# Patient Record
Sex: MALE | Race: ASIAN | URBAN - METROPOLITAN AREA
[De-identification: names, ages, dates, MRNs, and addresses within clinical notes are randomized per-mention and may not be internally consistent; named-entity substitution may affect disease eponyms.]

---

## 2018-03-06 ENCOUNTER — OFFICE VISIT (OUTPATIENT)
Dept: URGENT CARE | Facility: URGENT CARE | Age: 28
End: 2018-03-06
Payer: COMMERCIAL

## 2018-03-06 VITALS
WEIGHT: 200 LBS | HEART RATE: 96 BPM | DIASTOLIC BLOOD PRESSURE: 98 MMHG | TEMPERATURE: 98.2 F | RESPIRATION RATE: 12 BRPM | OXYGEN SATURATION: 98 % | SYSTOLIC BLOOD PRESSURE: 130 MMHG

## 2018-03-06 DIAGNOSIS — G43.809 OTHER MIGRAINE WITHOUT STATUS MIGRAINOSUS, NOT INTRACTABLE: ICD-10-CM

## 2018-03-06 DIAGNOSIS — R06.02 SOB (SHORTNESS OF BREATH): Primary | ICD-10-CM

## 2018-03-06 LAB
BASOPHILS # BLD AUTO: 0 10E9/L (ref 0–0.2)
BASOPHILS NFR BLD AUTO: 0.1 %
DIFFERENTIAL METHOD BLD: NORMAL
EOSINOPHIL # BLD AUTO: 0.1 10E9/L (ref 0–0.7)
EOSINOPHIL NFR BLD AUTO: 1.3 %
ERYTHROCYTE [DISTWIDTH] IN BLOOD BY AUTOMATED COUNT: 13.1 % (ref 10–15)
HCT VFR BLD AUTO: 47 % (ref 40–53)
HGB BLD-MCNC: 16 G/DL (ref 13.3–17.7)
LYMPHOCYTES # BLD AUTO: 3 10E9/L (ref 0.8–5.3)
LYMPHOCYTES NFR BLD AUTO: 42.3 %
MCH RBC QN AUTO: 28.3 PG (ref 26.5–33)
MCHC RBC AUTO-ENTMCNC: 34 G/DL (ref 31.5–36.5)
MCV RBC AUTO: 83 FL (ref 78–100)
MONOCYTES # BLD AUTO: 0.7 10E9/L (ref 0–1.3)
MONOCYTES NFR BLD AUTO: 9.4 %
NEUTROPHILS # BLD AUTO: 3.4 10E9/L (ref 1.6–8.3)
NEUTROPHILS NFR BLD AUTO: 46.9 %
PLATELET # BLD AUTO: 194 10E9/L (ref 150–450)
RBC # BLD AUTO: 5.66 10E12/L (ref 4.4–5.9)
WBC # BLD AUTO: 7.2 10E9/L (ref 4–11)

## 2018-03-06 PROCEDURE — 85025 COMPLETE CBC W/AUTO DIFF WBC: CPT | Performed by: FAMILY MEDICINE

## 2018-03-06 PROCEDURE — 99204 OFFICE O/P NEW MOD 45 MIN: CPT | Performed by: FAMILY MEDICINE

## 2018-03-06 PROCEDURE — 36415 COLL VENOUS BLD VENIPUNCTURE: CPT | Performed by: FAMILY MEDICINE

## 2018-03-06 PROCEDURE — 93000 ELECTROCARDIOGRAM COMPLETE: CPT | Performed by: FAMILY MEDICINE

## 2018-03-06 RX ORDER — PROMETHAZINE HYDROCHLORIDE 25 MG/1
25 TABLET ORAL EVERY 8 HOURS PRN
Qty: 10 TABLET | Refills: 0 | Status: SHIPPED | OUTPATIENT
Start: 2018-03-06

## 2018-03-06 RX ORDER — BIOTIN 10 MG
10000 TABLET ORAL DAILY
COMMUNITY

## 2018-03-06 NOTE — MR AVS SNAPSHOT
"              After Visit Summary   3/6/2018    Sana Lloyd    MRN: [id number]           Patient Information     Date Of Birth          1990        Visit Information        Provider Department      3/6/2018 7:45 PM Mirtha Michael MD [facility]        Today's Diagnoses     SOB (shortness of breath)    -  1    Other migraine without status migrainosus, not intractable           Follow-ups after your visit        Who to contact     If you have questions or need follow up information about today's clinic visit or your schedule please contact [facility] directly at [phone].  Normal or non-critical lab and imaging results will be communicated to you by MyChart, letter or phone within 4 business days after the clinic has received the results. If you do not hear from us within 7 days, please contact the clinic through [employer] or phone. If you have a critical or abnormal lab result, we will notify you by phone as soon as possible.  Submit refill requests through [employer] or call your pharmacy and they will forward the refill request to us. Please allow 3 business days for your refill to be completed.          Additional Information About Your Visit        MyChart Information     [employer] lets you send messages to your doctor, view your test results, renew your prescriptions, schedule appointments and more. To sign up, go to [email or web address] . Click on \"Log in\" on the left side of the screen, which will take you to the Welcome page. Then click on \"Sign up Now\" on the right side of the page.     You will be asked to enter the access code listed below, as well as some personal information. Please follow the directions to create your username and password.     Your access code is: [email or web address]  Expires: 2018  3:24 PM     Your access code will  in 90 days. If you need help or a new code, please call your [facility] clinic or [phone].        Care EveryWhere ID     " This is your Care EveryWhere ID. This could be used by other organizations to access your [place] medical records  [id number]        Your Vitals Were     Pulse Temperature Respirations Pulse Oximetry          96 98.2  F (36.8  C) (Tympanic) 12 98%         Blood Pressure from Last 3 Encounters:   03/06/18 (!) 130/98    Weight from Last 3 Encounters:   03/06/18 200 lb (90.7 kg)              We Performed the Following     CBC with platelets differential     EKG 12-lead complete w/read - Clinics          Today's Medication Changes          These changes are accurate as of 3/6/18 11:59 PM.  If you have any questions, ask your nurse or doctor.               Start taking these medicines.        Dose/Directions    promethazine 25 MG tablet   Commonly known as:  PHENERGAN   Used for:  Other migraine without status migrainosus, not intractable   Started by:  Mirtha Michael MD        Dose:  25 mg   Take 1 tablet (25 mg) by mouth every 8 hours as needed for nausea   Quantity:  10 tablet   Refills:  0            Where to get your medicines      Some of these will need a paper prescription and others can be bought over the counter.  Ask your nurse if you have questions.     Bring a paper prescription for each of these medications     promethazine 25 MG tablet                Primary Care Provider Fax #    Physician No Ref-Primary [phone]       No address on file        Equal Access to Services     [facility]: Emily paulao Sosherrieali, waaxda luqadaha, qaybta kaalmada adeegyada, timo harp. So [facility] [phone].    ATENCIÓN: Si habla español, tiene a palumbo disposición servicios gratuitos de asistencia lingüística. Llame al [phone].    We comply with applicable federal civil rights laws and [place] laws. We do not discriminate on the basis of race, color, national origin, age, disability, sex, sexual orientation, or gender identity.            Thank you!     Thank you for  [facility]  for your care. Our goal is always to provide you with excellent care. Hearing back from our patients is one way we can continue to improve our services. Please take a few minutes to complete the written survey that you may receive in the mail after your visit with us. Thank you!             Your Updated Medication List - Protect others around you: Learn how to safely use, store and throw away your medicines at www.disposemymeds.org.          This list is accurate as of 3/6/18 11:59 PM.  Always use your most recent med list.                   Brand Name Dispense Instructions for use Diagnosis    acetaminophen-caffeine 500-65 MG Tabs    EXCEDRIN TENSION HEADACHE     Take 2 tablets by mouth every 6 hours as needed for mild pain        APPLE CIDER VINEGAR PO           Biotin 10 MG Tabs tablet      Take 10,000 mcg by mouth daily        promethazine 25 MG tablet    PHENERGAN    10 tablet    Take 1 tablet (25 mg) by mouth every 8 hours as needed for nausea    Other migraine without status migrainosus, not intractable

## 2018-03-07 NOTE — PROGRESS NOTES
SUBJECTIVE:   Sana Lloyd is a 27 year old male presenting with a chief complaint of   Chief Complaint   Patient presents with     Urgent Care     Shortness of breath and dizziness since 4pm today. Migraines off and on since last thursday.       He is a new patient of [place].    Pt presents with shortness of breath and dizziness since 4 pm today.  He describes the dizziness as a sensation of being light-headed.  He denies vertigo.  Has a history of migraines, and has had a headache intermittently since last Thursday.  Headache is mostly right-sided and seems to start from his neck and move over the top of his head.  He has been managing this with Excedrin, which provides some, but not complete, relief of symptoms.  He has been feeling a little more tired.      No prior pulmonary problems, including asthma.    Review of Systems   Constitutional: Positive for fatigue. Negative for fever.   HENT: Negative for congestion, sore throat and trouble swallowing.    Eyes: Negative for visual disturbance.   Respiratory: Negative for cough and wheezing.    Cardiovascular: Negative for chest pain and palpitations.   Gastrointestinal: Negative for diarrhea and vomiting.   Musculoskeletal: Negative for gait problem.   Skin: Negative for rash.   Neurological: Positive for light-headedness. Negative for speech difficulty, weakness and numbness.   Psychiatric/Behavioral: Negative for confusion.       No past medical history on file.--migraines  No family history on file.   No family history of lung issues.  Sister had anemia, he thinks.    Current Outpatient Prescriptions   Medication Sig Dispense Refill     promethazine (PHENERGAN) 25 MG tablet Take 1 tablet (25 mg) by mouth every 8 hours as needed for nausea 10 tablet 0     acetaminophen-caffeine (EXCEDRIN TENSION HEADACHE) 500-65 MG TABS Take 2 tablets by mouth every 6 hours as needed for mild pain       Biotin 10 MG TABS tablet Take 10,000 mcg by mouth daily       [facility]ER  [facility]         Social History   Substance Use Topics     Smoking status: Current Every Day Smoker     Smokeless tobacco: Never Used     Alcohol use Not on file       OBJECTIVE  BP (!) 130/98 (BP Location: Right arm, Patient Position: Sitting, Cuff Size: Adult Regular)  Pulse 96  Temp 98.2  F (36.8  C) (Tympanic)  Resp 12  Wt 200 lb (90.7 kg)  SpO2 98%    Physical Exam   Constitutional: He is oriented to person, place, and time. He appears well-developed and well-nourished. No distress.   HENT:   Head: Normocephalic and atraumatic.   Right Ear: Tympanic membrane, external ear and ear canal normal.   Left Ear: Tympanic membrane, external ear and ear canal normal.   Mouth/Throat: Oropharynx is clear and moist. No oropharyngeal exudate.   Eyes: Conjunctivae are normal. Pupils are equal, round, and reactive to light. Right eye exhibits no discharge. Left eye exhibits no discharge. No scleral icterus.   Neck: Normal range of motion. Neck supple. No tracheal deviation present. No thyromegaly present.   Cardiovascular: Regular rhythm and normal heart sounds.  Exam reveals no friction rub.    No murmur heard.  Very slightly tachycardic   Pulmonary/Chest: Effort normal and breath sounds normal. No stridor. No respiratory distress.   Abdominal: Soft. Bowel sounds are normal.   Musculoskeletal: He exhibits no edema or deformity.   Lymphadenopathy:     He has no cervical adenopathy.   Neurological: He is alert and oriented to person, place, and time. No cranial nerve deficit. He exhibits normal muscle tone. Coordination normal.   Skin: Skin is warm and dry. No rash noted. He is not diaphoretic.   Psychiatric: He has a normal mood and affect. His behavior is normal.       Labs:  Results for orders placed or performed in visit on 03/06/18   CBC with platelets differential   Result Value Ref Range    WBC 7.2 4.0 - 11.0 10e9/L    RBC Count 5.66 4.4 - 5.9 10e12/L    Hemoglobin 16.0 13.3 - 17.7 g/dL    Hematocrit 47.0 40.0 -  53.0 %    MCV 83 78 - 100 fl    MCH 28.3 26.5 - 33.0 pg    MCHC 34.0 31.5 - 36.5 g/dL    RDW 13.1 10.0 - 15.0 %    Platelet Count 194 150 - 450 10e9/L    Diff Method Automated Method     % Neutrophils 46.9 %    % Lymphocytes 42.3 %    % Monocytes 9.4 %    % Eosinophils 1.3 %    % Basophils 0.1 %    Absolute Neutrophil 3.4 1.6 - 8.3 10e9/L    Absolute Lymphocytes 3.0 0.8 - 5.3 10e9/L    Absolute Monocytes 0.7 0.0 - 1.3 10e9/L    Absolute Eosinophils 0.1 0.0 - 0.7 10e9/L    Absolute Basophils 0.0 0.0 - 0.2 10e9/L     EKG shows sinus tachycardia with a rate of 108 bpm.  There are no ST/T changes to suggest ischemia or strain.  There are no ectopic beats.  Normal axis.      ASSESSMENT:      ICD-10-CM    1. SOB (shortness of breath) R06.02 EKG 12-lead complete w/read - Clinics     CBC with platelets differential   2. Other migraine without status migrainosus, not intractable G43.809 promethazine (PHENERGAN) 25 MG tablet        Medical Decision Making:  Shortness of breath does not seem to stem from an arrhythmia (reassuring EKG), anemia (normal Hgb).  PE very unlikely given stable vitals, including oxygen sat of 98%.  No evidence of pneumonia on exam.  I don't see a clear etiology for this shortness of breath at this time, but I also don't see anything to suggest that there is a major issue underlying it.    Migraines seem to respond fairly well to OTC analgesic, but given clustering of headaches in recent days, I think it's reasonable to add Phenergan to see if this will help to break the migraine cycle. May benefit from follow up with neurologist if headache frequency stays high.    PLAN:  Phenergan 25 mg PO up to three times a day as needed for nausea/headache.  Reassured pt regarding normal EKG and bloodwork.  Discussed signs/symptoms to watch for and what should prompt follow up in clinic vs ER.  Encouraged pt to establish with a primary care provider.

## 2018-03-07 NOTE — NURSING NOTE
Chief Complaint   Patient presents with     Urgent Care     Shortness of breath and dizziness since 4pm today. Migraines off and on since last thursday.       Initial BP (!) 130/98 (BP Location: Right arm, Patient Position: Sitting, Cuff Size: Adult Regular)  Pulse 96  Temp 98.2  F (36.8  C) (Tympanic)  Resp 12  Wt 200 lb (90.7 kg)  SpO2 98% There is no height or weight on file to calculate BMI.  Medication Reconciliation: complete   Jagruti Wong CMA (AAMA)

## 2018-03-13 ASSESSMENT — ENCOUNTER SYMPTOMS
VOMITING: 0
SORE THROAT: 0
WHEEZING: 0
FATIGUE: 1
CONFUSION: 0
DIARRHEA: 0
TROUBLE SWALLOWING: 0
WEAKNESS: 0
NUMBNESS: 0
FEVER: 0
PALPITATIONS: 0
LIGHT-HEADEDNESS: 1
SPEECH DIFFICULTY: 0
COUGH: 0

## 2018-04-05 ENCOUNTER — OFFICE VISIT (OUTPATIENT)
Dept: FAMILY MEDICINE | Facility: CLINIC | Age: 28
End: 2018-04-05
Payer: COMMERCIAL

## 2018-04-05 ENCOUNTER — RADIANT APPOINTMENT (OUTPATIENT)
Dept: GENERAL RADIOLOGY | Facility: CLINIC | Age: 28
End: 2018-04-05
Attending: FAMILY MEDICINE
Payer: COMMERCIAL

## 2018-04-05 ENCOUNTER — NURSE TRIAGE (OUTPATIENT)
Dept: NURSING | Facility: CLINIC | Age: 28
End: 2018-04-05

## 2018-04-05 VITALS
OXYGEN SATURATION: 97 % | TEMPERATURE: 98.1 F | HEART RATE: 95 BPM | DIASTOLIC BLOOD PRESSURE: 97 MMHG | HEIGHT: 71 IN | BODY MASS INDEX: 28.06 KG/M2 | SYSTOLIC BLOOD PRESSURE: 126 MMHG | WEIGHT: 200.4 LBS

## 2018-04-05 DIAGNOSIS — S59.902A ELBOW INJURY, LEFT, INITIAL ENCOUNTER: ICD-10-CM

## 2018-04-05 DIAGNOSIS — S59.902A ELBOW INJURY, LEFT, INITIAL ENCOUNTER: Primary | ICD-10-CM

## 2018-04-05 DIAGNOSIS — S52.125A CLOSED NONDISPLACED FRACTURE OF HEAD OF LEFT RADIUS, INITIAL ENCOUNTER: ICD-10-CM

## 2018-04-05 PROCEDURE — 99213 OFFICE O/P EST LOW 20 MIN: CPT | Performed by: FAMILY MEDICINE

## 2018-04-05 PROCEDURE — 73080 X-RAY EXAM OF ELBOW: CPT | Mod: LT

## 2018-04-05 NOTE — MR AVS SNAPSHOT
"              After Visit Summary   2018    Sana Lloyd    MRN: [id number]           Patient Information     Date Of Birth          1990        Visit Information        Provider Department      2018 3:40 PM Sergio Vargas MD [facility]        Today's Diagnoses     Elbow injury, left, initial encounter    -  1      Care Instructions    acetomenophen 1000mg up to 3 times daily    ibuprofen 800mg up to 3 times daily          Follow-ups after your visit        Who to contact     If you have questions or need follow up information about today's clinic visit or your schedule please contact [facility] directly at [phone].  Normal or non-critical lab and imaging results will be communicated to you by MyChart, letter or phone within 4 business days after the clinic has received the results. If you do not hear from us within 7 days, please contact the clinic through [employer] or phone. If you have a critical or abnormal lab result, we will notify you by phone as soon as possible.  Submit refill requests through [employer] or call your pharmacy and they will forward the refill request to us. Please allow 3 business days for your refill to be completed.          Additional Information About Your Visit        MyChart Information     [employer] lets you send messages to your doctor, view your test results, renew your prescriptions, schedule appointments and more. To sign up, go to [email or web address] . Click on \"Log in\" on the left side of the screen, which will take you to the Welcome page. Then click on \"Sign up Now\" on the right side of the page.     You will be asked to enter the access code listed below, as well as some personal information. Please follow the directions to create your username and password.     Your access code is: [email or web address]  Expires: 2018  3:24 PM     Your access code will  in 90 days. If you need help or a new code, please call your " "[facility] or [phone].        Care EveryWhere ID     This is your Care EveryWhere ID. This could be used by other organizations to access your [place] medical records  [id number]        Your Vitals Were     Pulse Temperature Height Pulse Oximetry BMI (Body Mass Index)       95 98.1  F (36.7  C) (Oral) 5' 11.25\" (1.81 m) 97% 27.75 kg/m2        Blood Pressure from Last 3 Encounters:   04/05/18 (!) 126/97   03/06/18 (!) 130/98    Weight from Last 3 Encounters:   04/05/18 200 lb 6.4 oz (90.9 kg)   03/06/18 200 lb (90.7 kg)              Today, you had the following     No orders found for display       Primary Care Provider Fax #    Physician No Ref-Primary [phone]       No address on file        Equal Access to Services     [facility]: Hadsharad Benedict, kole galeano, darcy kaalmanidia griffin, timo castañeda . So [facility] [phone].    ATENCIÓN: Si habla español, tiene a palumbo disposición servicios gratuitos de asistencia lingüística. Llame al [phone].    We comply with applicable federal civil rights laws and [place] laws. We do not discriminate on the basis of race, color, national origin, age, disability, sex, sexual orientation, or gender identity.            Thank you!     Thank you for choosing [facility]  for your care. Our goal is always to provide you with excellent care. Hearing back from our patients is one way we can continue to improve our services. Please take a few minutes to complete the written survey that you may receive in the mail after your visit with us. Thank you!             Your Updated Medication List - Protect others around you: Learn how to safely use, store and throw away your medicines at www.disposemymeds.org.          This list is accurate as of 4/5/18  4:37 PM.  Always use your most recent med list.                   Brand Name Dispense Instructions for use Diagnosis    acetaminophen-caffeine 500-65 MG " Tabs    EXCEDRIN TENSION HEADACHE     Take 2 tablets by mouth every 6 hours as needed for mild pain        APPLE CIDER VINEGAR PO           Biotin 10 MG Tabs tablet      Take 10,000 mcg by mouth daily        promethazine 25 MG tablet    PHENERGAN    10 tablet    Take 1 tablet (25 mg) by mouth every 8 hours as needed for nausea    Other migraine without status migrainosus, not intractable

## 2018-04-05 NOTE — PROGRESS NOTES
"  SUBJECTIVE:                                                    Sana Lloyd is a 27 year old male who presents to clinic today for the following health issues:      Slipped and fell on left elbow occurred yesterday. Hurt to shara and when sleeping.     No numbness/tingling     Using ice and ibuprofen.     OBJECTIVE: BP (!) 126/97  Pulse 95  Temp 98.1  F (36.7  C) (Oral)  Ht 5' 11.25\" (1.81 m)  Wt 200 lb 6.4 oz (90.9 kg)  SpO2 97%  BMI 27.75 kg/m2 no apparent distress   Elbow exam: reduced range of motion of flexion and extension and rotation. Effusion present. Tender over radial head and lateral elbow.   Cms intact.       Xray with nondisplaced radial head fracture affecting approx 20% of the joint surface.       ICD-10-CM    1. Elbow injury, left, initial encounter S59.902A order for DME     ORTHO  REFERRAL     CANCELED: XR Elbow Left 2 Views   2. Closed nondisplaced fracture of head of left radius, initial encounter S52.125A ORTHO  REFERRAL    sling and PRICE therapy. follow up with sports med. Use of OTC  meds. Discussed.  "

## 2018-04-05 NOTE — TELEPHONE ENCOUNTER
Reason for Disposition    Can't bend injured elbow at all    Additional Information    Negative: Serious injury with multiple fractures    Negative: [1] Major bleeding (e.g., actively dripping or spurting) AND [2] can't be stopped    Negative: Bullet wound, stabbed by knife, or other serious penetrating wound    Negative: Sounds like a life-threatening emergency to the triager    Negative: Wound looks infected    Negative: Elbow pain from overuse (work, exercise, gardening) OR from self-induced lifting injury    Negative: Elbow pain not from an injury    Negative: Looks like a broken bone or dislocated joint (e.g., crooked or deformed)    Negative: Skin is split open or gaping  (or length > 1/2 inch or 12 mm)    Negative: [1] Bleeding AND [2] won't stop after 10 minutes of direct pressure (using correct technique)    Negative: [1] Dirt in the wound AND [2] not removed with 15 minutes of scrubbing    Protocols used: ELBOW INJURY-ADULT-    He said his insurance won't cover an ER visit. He was going to go to urgent care but wants to try for a clinic visit first. I connected with scheduling for an appointment.  Andra Richmond RN-[facility]

## 2018-04-10 ENCOUNTER — OFFICE VISIT (OUTPATIENT)
Dept: ORTHOPEDICS | Facility: CLINIC | Age: 28
End: 2018-04-10

## 2018-04-10 ENCOUNTER — RADIANT APPOINTMENT (OUTPATIENT)
Dept: GENERAL RADIOLOGY | Facility: CLINIC | Age: 28
End: 2018-04-10

## 2018-04-10 VITALS — WEIGHT: 200 LBS | RESPIRATION RATE: 16 BRPM | BODY MASS INDEX: 28 KG/M2 | HEIGHT: 71 IN

## 2018-04-10 DIAGNOSIS — S52.125A CLOSED NONDISPLACED FRACTURE OF HEAD OF LEFT RADIUS: ICD-10-CM

## 2018-04-10 DIAGNOSIS — S52.125A CLOSED NONDISPLACED FRACTURE OF HEAD OF LEFT RADIUS, INITIAL ENCOUNTER: Primary | ICD-10-CM

## 2018-04-10 DIAGNOSIS — S52.125A CLOSED NONDISPLACED FRACTURE OF HEAD OF LEFT RADIUS: Primary | ICD-10-CM

## 2018-04-10 NOTE — PROGRESS NOTES
"Sports Medicine Clinic Visit    PCP: No Ref-Primary, Physician    Sana Lloyd is a 27 year old male who is seen  in consultation at the request of  presenting with left radial head fracture.    Injury: He fell on ice and landed on left elbow.     Location of Pain: left elbow  Duration of Pain: 6 day(s)  Rating of Pain: 4/10  Pain is better with: Ice  Pain is worse with: Elbow extension, sleeping, elbow flexion for long periods of time  Additional Features: He is right handed, Works for a non-profit   Treatment so far consists of: Ice, sling, ace wrapping  Prior History of related problems: None    Resp 16  Ht 5' 11.25\" (1.81 m)  Wt 200 lb (90.7 kg)  BMI 27.7 kg/m2         PMH:  No past medical history on file.    Active problem list:  There is no problem list on file for this patient.      FH:  No family history on file.    SH:  Social History     Social History     Marital status: Single     Spouse name: N/A     Number of children: N/A     Years of education: N/A     Occupational History     Not on file.     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 Right handed.    MEDS:  See EMR, reviewed  ALL:  See EMR, reviewed    REVIEW OF SYSTEMS:  CONSTITUTIONAL:NEGATIVE for fever, chills, change in weight  INTEGUMENTARY/SKIN: NEGATIVE for worrisome rashes, moles or lesions  EYES: NEGATIVE for vision changes or irritation  ENT/MOUTH: NEGATIVE for ear, mouth and throat problems  RESP:NEGATIVE for significant cough or SOB  BREAST: NEGATIVE for masses, tenderness or discharge  CV: NEGATIVE for chest pain, palpitations or peripheral edema  GI: NEGATIVE for nausea, abdominal pain, heartburn, or change in bowel habits  :NEGATIVE for frequency, dysuria, or " hematuria  :NEGATIVE for frequency, dysuria, or hematuria  NEURO: NEGATIVE for weakness, dizziness or paresthesias  ENDOCRINE: NEGATIVE for temperature intolerance, skin/hair changes  HEME/ALLERGY/IMMUNE: NEGATIVE for bleeding problems  PSYCHIATRIC: NEGATIVE for changes in mood or affect    OBJECTIVE:  He denies shoulder or wrist discomfort.  He is nontender at the scaphoid, the lunate, the distal radius or distal ulna on the left.  His grasp strength is full.  He has some residual swelling of the left elbow.  He can achieve nearly full passive extension and flexion allowed to about 90 degrees.  He is nontender over the lateral epicondyle, medial epicondyle, ulnar groove and mildly tender over the radial head.  There is no tenderness at the shoulder, at the AC joint, anterior cuff or biceps tendon . No tenderness at the proximal humerus.  His internal and external rotation strength against resistance is intact without pain.      IMAGING:  A repeat set of x-rays compared to 6 days ago show a nondisplaced proximal radial head fracture with no signs of step-off.      ASSESSMENT:  Nondisplaced radial head fracture.      PLAN:  He has a sling for comfort.  He can remove it for gentle range of motion in a forward plane.  He will avoid excessive pronation and supination over the next 3 weeks.  He will avoid pushing and weightbearing with the extremity for the next 4 weeks.  He will follow up in 2-3 weeks for a repeat x-ray and clinical exam.  He declined the need for pain medicines.

## 2018-04-10 NOTE — Clinical Note
Thank you for allowing me to see your patient in Sports Medicine Clinic.  Please see the attached copy of our visit.  Sincerely,  Agustin More MD

## 2018-04-10 NOTE — LETTER
"  4/10/2018      RE: Sana Lloyd  [address]    [place] [address]       Sports Medicine Clinic Visit    PCP: No Ref-Primary, Physician    Sana Lloyd is a 27 year old male who is seen  in consultation at the request of  presenting with left radial head fracture.    Injury: He fell on ice and landed on left elbow.     Location of Pain: left elbow  Duration of Pain: 6 day(s)  Rating of Pain: 4/10  Pain is better with: Ice  Pain is worse with: Elbow extension, sleeping, elbow flexion for long periods of time  Additional Features: He is right handed, Works for a non-profit   Treatment so far consists of: Ice, sling, ace wrapping  Prior History of related problems: None    Resp 16  Ht 5' 11.25\" (1.81 m)  Wt 200 lb (90.7 kg)  BMI 27.7 kg/m2         PMH:  No past medical history on file.    Active problem list:  There is no problem list on file for this patient.      FH:  No family history on file.    SH:  Social History     Social History     Marital status: Single     Spouse name: N/A     Number of children: N/A     Years of education: N/A     Occupational History     Not on file.     Social History Main Topics     Smoking status: Current Every Day Smoker     Smokeless tobacco: Never Used     Alcohol use Yes     Drug use: No     Sexual activity: Yes     Partners: Male     Other Topics Concern     Not on file     Social History Narrative   works for a [employer], desk work, typing. Right handed.    MEDS:  See EMR, reviewed  ALL:  See EMR, reviewed    REVIEW OF SYSTEMS:  CONSTITUTIONAL:NEGATIVE for fever, chills, change in weight  INTEGUMENTARY/SKIN: NEGATIVE for worrisome rashes, moles or lesions  EYES: NEGATIVE for vision changes or irritation  ENT/MOUTH: NEGATIVE for ear, mouth and throat problems  RESP:NEGATIVE for significant cough or SOB  BREAST: NEGATIVE for masses, tenderness or discharge  CV: NEGATIVE for chest pain, palpitations or peripheral edema  GI: NEGATIVE for nausea, abdominal pain, " heartburn, or change in bowel habits  :NEGATIVE for frequency, dysuria, or hematuria  :NEGATIVE for frequency, dysuria, or hematuria  NEURO: NEGATIVE for weakness, dizziness or paresthesias  ENDOCRINE: NEGATIVE for temperature intolerance, skin/hair changes  HEME/ALLERGY/IMMUNE: NEGATIVE for bleeding problems  PSYCHIATRIC: NEGATIVE for changes in mood or affect    OBJECTIVE:  He denies shoulder or wrist discomfort.  He is nontender at the scaphoid, the lunate, the distal radius or distal ulna on the left.  His grasp strength is full.  He has some residual swelling of the left elbow.  He can achieve nearly full passive extension and flexion allowed to about 90 degrees.  He is nontender over the lateral epicondyle, medial epicondyle, ulnar groove and mildly tender over the radial head.  There is no tenderness at the shoulder, at the AC joint, anterior cuff or biceps tendon . No tenderness at the proximal humerus.  His internal and external rotation strength against resistance is intact without pain.      IMAGING:  A repeat set of x-rays compared to 6 days ago show a nondisplaced proximal radial head fracture with no signs of step-off.      ASSESSMENT:  Nondisplaced radial head fracture.      PLAN:  He has a sling for comfort.  He can remove it for gentle range of motion in a forward plane.  He will avoid excessive pronation and supination over the next 3 weeks.  He will avoid pushing and weightbearing with the extremity for the next 4 weeks.  He will follow up in 2-3 weeks for a repeat x-ray and clinical exam.  He declined the need for pain medicines.       Agustin More MD

## 2018-04-10 NOTE — MR AVS SNAPSHOT
After Visit Summary   4/10/2018    Sana Lloyd    MRN: [id number]           Patient Information     Date Of Birth          1990        Visit Information        Provider Department      4/10/2018 12:15 PM Agustin More MD [facility] Medicine        Today's Diagnoses     Closed nondisplaced fracture of head of left radius, initial encounter    -  1       Follow-ups after your visit        Your next 10 appointments already scheduled     2018 12:00 PM CDT   (Arrive by 11:45 AM)   Return Visit with Agustin More MD   [facility] ([facility])    [address] [place] [id number] [phone]              Who to contact     Please call your clinic at [phone] to:    Ask questions about your health    Make or cancel appointments    Discuss your medicines    Learn about your test results    Speak to your doctor            Additional Information About Your Visit        MyChart Information     [employer] is an electronic gateway that provides easy, online access to your medical records. With [employer], you can request a clinic appointment, read your test results, renew a prescription or communicate with your care team.     To sign up for [employer] visit the website at [email or web address]   You will be asked to enter the access code listed below, as well as some personal information. Please follow the directions to create your username and password.     Your access code is: [email or web address]  Expires: 2018  3:24 PM     Your access code will  in 90 days. If you need help or a new code, please contact your [facility] or call [phone] for assistance.        Care EveryWhere ID     This is your Care EveryWhere ID. This could be used by other organizations to access your [place] medical records  [id number]        Your Vitals Were     Respirations Height BMI (Body Mass Index)  "            16 5' 11.25\" (1.81 m) 27.7 kg/m2          Blood Pressure from Last 3 Encounters:   04/05/18 (!) 126/97   03/06/18 (!) 130/98    Weight from Last 3 Encounters:   04/10/18 200 lb (90.7 kg)   04/05/18 200 lb 6.4 oz (90.9 kg)   03/06/18 200 lb (90.7 kg)              Today, you had the following     No orders found for display       Primary Care Provider Fax #    Physician No Ref-Primary [phone]       No address on file        Equal Access to Services     [facility]: Hadii kiana winter Sodelon, waaxda luqadaha, qaybprasanth kaalmanidia griffin, timo castañeda . So [facility] [phone].    ATENCIÓN: Si habla español, tiene a palumbo disposición servicios gratuitos de asistencia lingüística. Llame al [phone].    We comply with applicable federal civil rights laws and [place] laws. We do not discriminate on the basis of race, color, national origin, age, disability, sex, sexual orientation, or gender identity.            Thank you!     Thank you for choosing [facility]  for your care. Our goal is always to provide you with excellent care. Hearing back from our patients is one way we can continue to improve our services. Please take a few minutes to complete the written survey that you may receive in the mail after your visit with us. Thank you!             Your Updated Medication List - Protect others around you: Learn how to safely use, store and throw away your medicines at www.disposemymeds.org.          This list is accurate as of 4/10/18 12:34 PM.  Always use your most recent med list.                   Brand Name Dispense Instructions for use Diagnosis    acetaminophen-caffeine 500-65 MG Tabs    EXCEDRIN TENSION HEADACHE     Take 2 tablets by mouth every 6 hours as needed for mild pain        APPLE CIDER VINEGAR PO           Biotin 10 MG Tabs tablet      Take 10,000 mcg by mouth daily        IBUPROFEN PO           order for DME     1 Device    Equipment being " ordered: sling    Elbow injury, left, initial encounter       promethazine 25 MG tablet    PHENERGAN    10 tablet    Take 1 tablet (25 mg) by mouth every 8 hours as needed for nausea    Other migraine without status migrainosus, not intractable

## 2018-04-23 DIAGNOSIS — S52.125A CLOSED NONDISPLACED FRACTURE OF HEAD OF LEFT RADIUS: Primary | ICD-10-CM

## 2018-04-24 ENCOUNTER — OFFICE VISIT (OUTPATIENT)
Dept: ORTHOPEDICS | Facility: CLINIC | Age: 28
End: 2018-04-24

## 2018-04-24 ENCOUNTER — RADIANT APPOINTMENT (OUTPATIENT)
Dept: GENERAL RADIOLOGY | Facility: CLINIC | Age: 28
End: 2018-04-24

## 2018-04-24 VITALS — WEIGHT: 200 LBS | HEIGHT: 71 IN | BODY MASS INDEX: 28 KG/M2 | RESPIRATION RATE: 16 BRPM

## 2018-04-24 DIAGNOSIS — S52.125D CLOSED NONDISPLACED FRACTURE OF HEAD OF LEFT RADIUS WITH ROUTINE HEALING, SUBSEQUENT ENCOUNTER: Primary | ICD-10-CM

## 2018-04-24 DIAGNOSIS — S52.125A CLOSED NONDISPLACED FRACTURE OF HEAD OF LEFT RADIUS: ICD-10-CM

## 2018-04-24 NOTE — PROGRESS NOTES
April 24, 2018: Snaa Lloyd is a 27 year old male who is seen in f/u up for closed nondisplaced fracture of head of left radius.  Notes improvement with elbow flexion, still is uncomfortable with pronation.      PMH:  No past medical history on file.    Active problem list:  There is no problem list on file for this patient.      FH:  No family history on file.    SH:  Social History     Social History     Marital status: Single     Spouse name: N/A     Number of children: N/A     Years of education: N/A     Occupational History     Not on file.     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 Right handed.  From [facility], [place]. Mother medic [employer].    MEDS:  See EMR, reviewed  ALL:  See EMR, reviewed    REVIEW OF SYSTEMS:  CONSTITUTIONAL:NEGATIVE for fever, chills, change in weight  INTEGUMENTARY/SKIN: NEGATIVE for worrisome rashes, moles or lesions  EYES: NEGATIVE for vision changes or irritation  ENT/MOUTH: NEGATIVE for ear, mouth and throat problems  RESP:NEGATIVE for significant cough or SOB  BREAST: NEGATIVE for masses, tenderness or discharge  CV: NEGATIVE for chest pain, palpitations or peripheral edema  GI: NEGATIVE for nausea, abdominal pain, heartburn, or change in bowel habits  :NEGATIVE for frequency, dysuria, or hematuria  :NEGATIVE for frequency, dysuria, or hematuria  NEURO: NEGATIVE for weakness, dizziness or paresthesias  ENDOCRINE: NEGATIVE for temperature intolerance, skin/hair change  HEME/ALLERGY/IMMUNE: NEGATIVE for bleeding problems  PSYCHIATRIC: NEGATIVE for changes in mood or affect              OBJECTIVE:  He has nearly full extension compared to the non-affected elbow, missing only a few degrees of recurvatum.   Flexion is symmetrical to the 2 elbows.  He can pronate and supinate fully without any significant discomfort.  Overlying skin is intact.  There is a very mild amount of residual swelling.  Strength is intact in the hand.  Sensation is normal.  Distal pulses normal.  Appropriate in conversation and affect.      A repeat x-ray shows new bone formation, healing and some bony resorption consistent with ongoing healing involving the closed, nondisplaced head of the left radius.      ASSESSMENT:  Left radial head fracture, improved.      PLAN:  He will continue to limit himself from forceful weightbearing and forefoot pronation or supination over the next 3 weeks, but he can continue with his otherwise gentle range of motion exercises as outlined.  After 3-4 weeks if he feels that his range of motion and his symptoms have improved, he can start to be more active with his elbow.  If he is having trouble progressing with this, he will return for reevaluation.

## 2018-04-24 NOTE — LETTER
Date:April 27, 2018      Patient was self referred, no letter generated. Do not send.        [facility] Health Information

## 2018-04-24 NOTE — MR AVS SNAPSHOT
"              After Visit Summary   2018    Sana Lloyd    MRN: [id number]           Patient Information     Date Of Birth          1990        Visit Information        Provider Department      2018 12:00 PM Agustin More MD [facility] Sports Medicine        Today's Diagnoses     Closed nondisplaced fracture of head of left radius with routine healing, subsequent encounter    -  1       Follow-ups after your visit        Who to contact     Please call your clinic at [phone] to:    Ask questions about your health    Make or cancel appointments    Discuss your medicines    Learn about your test results    Speak to your doctor            Additional Information About Your Visit        MyChart Information     [employer] is an electronic gateway that provides easy, online access to your medical records. With [employer], you can request a clinic appointment, read your test results, renew a prescription or communicate with your care team.     To sign up for [employer] visit the website at [email or web address]   You will be asked to enter the access code listed below, as well as some personal information. Please follow the directions to create your username and password.     Your access code is: [email or web address]  Expires: 2018  3:24 PM     Your access code will  in 90 days. If you need help or a new code, please contact your [facility] or call [phone] for assistance.        Care EveryWhere ID     This is your Care EveryWhere ID. This could be used by other organizations to access your [place] medical records  [id number]        Your Vitals Were     Respirations Height BMI (Body Mass Index)             16 5' 11.25\" (1.81 m) 27.7 kg/m2          Blood Pressure from Last 3 Encounters:   18 (!) 126/97   18 (!) 130/98    Weight from Last 3 Encounters:   18 200 lb (90.7 kg)   04/10/18 200 lb (90.7 kg)   18 200 lb 6.4 oz (90.9 kg)            "   Today, you had the following     No orders found for display       Primary Care Provider Fax #    Physician No Ref-Primary [phone]       No address on file        Equal Access to Services     PADD[employer] HANSEN : Emily aad ku hadnavneet Benedict, kole galeano, darcy griffin, timo harp. So [facility] [phone].    ATENCIÓN: Si habla español, tiene a palumbo disposición servicios gratuitos de asistencia lingüística. Llame al [phone].    We comply with applicable federal civil rights laws and [place] laws. We do not discriminate on the basis of race, color, national origin, age, disability, sex, sexual orientation, or gender identity.            Thank you!     Thank you for choosing [facility]  for your care. Our goal is always to provide you with excellent care. Hearing back from our patients is one way we can continue to improve our services. Please take a few minutes to complete the written survey that you may receive in the mail after your visit with us. Thank you!             Your Updated Medication List - Protect others around you: Learn how to safely use, store and throw away your medicines at www.disposemymeds.org.          This list is accurate as of 4/24/18 11:59 PM.  Always use your most recent med list.                   Brand Name Dispense Instructions for use Diagnosis    acetaminophen-caffeine 500-65 MG Tabs    EXCEDRIN TENSION HEADACHE     Take 2 tablets by mouth every 6 hours as needed for mild pain        APPLE CIDER VINEGAR PO           Biotin 10 MG Tabs tablet      Take 10,000 mcg by mouth daily        IBUPROFEN PO           order for DME     1 Device    Equipment being ordered: sling    Elbow injury, left, initial encounter       promethazine 25 MG tablet    PHENERGAN    10 tablet    Take 1 tablet (25 mg) by mouth every 8 hours as needed for nausea    Other migraine without status migrainosus, not intractable

## 2018-04-24 NOTE — LETTER
4/24/2018      RE: Sana Lloyd  [address]    [place] [address]       April 24, 2018: Sana Lloyd is a 27 year old male who is seen in f/u up for closed nondisplaced fracture of head of left radius.  Notes improvement with elbow flexion, still is uncomfortable with pronation.      PMH:  No past medical history on file.    Active problem list:  There is no problem list on file for this patient.      FH:  No family history on file.    SH:  Social History     Social History     Marital status: Single     Spouse name: N/A     Number of children: N/A     Years of education: N/A     Occupational History     Not on file.     Social History Main Topics     Smoking status: Current Every Day Smoker     Smokeless tobacco: Never Used     Alcohol use Yes     Drug use: No     Sexual activity: Yes     Partners: Male     Other Topics Concern     Not on file     Social History Narrative   works for a [employer], [employer] work, typing. Right handed.  From [facility], [place]. Mother medic [employer].    MEDS:  See EMR, reviewed  ALL:  See EMR, reviewed    REVIEW OF SYSTEMS:  CONSTITUTIONAL:NEGATIVE for fever, chills, change in weight  INTEGUMENTARY/SKIN: NEGATIVE for worrisome rashes, moles or lesions  EYES: NEGATIVE for vision changes or irritation  ENT/MOUTH: NEGATIVE for ear, mouth and throat problems  RESP:NEGATIVE for significant cough or SOB  BREAST: NEGATIVE for masses, tenderness or discharge  CV: NEGATIVE for chest pain, palpitations or peripheral edema  GI: NEGATIVE for nausea, abdominal pain, heartburn, or change in bowel habits  :NEGATIVE for frequency, dysuria, or hematuria  :NEGATIVE for frequency, dysuria, or hematuria  NEURO: NEGATIVE for weakness, dizziness or paresthesias  ENDOCRINE: NEGATIVE for temperature intolerance, skin/hair change  HEME/ALLERGY/IMMUNE: NEGATIVE for bleeding problems  PSYCHIATRIC: NEGATIVE for changes in mood or affect              OBJECTIVE:  He has nearly full extension  compared to the non-affected elbow, missing only a few degrees of recurvatum.  Flexion is symmetrical to the 2 elbows.  He can pronate and supinate fully without any significant discomfort.  Overlying skin is intact.  There is a very mild amount of residual swelling.  Strength is intact in the hand.  Sensation is normal.  Distal pulses normal.  Appropriate in conversation and affect.      A repeat x-ray shows new bone formation, healing and some bony resorption consistent with ongoing healing involving the closed, nondisplaced head of the left radius.      ASSESSMENT:  Left radial head fracture, improved.      PLAN:  He will continue to limit himself from forceful weightbearing and forefoot pronation or supination over the next 3 weeks, but he can continue with his otherwise gentle range of motion exercises as outlined.  After 3-4 weeks if he feels that his range of motion and his symptoms have improved, he can start to be more active with his elbow.  If he is having trouble progressing with this, he will return for reevaluation.               Agustin More MD

## 2018-04-27 ENCOUNTER — TELEPHONE (OUTPATIENT)
Dept: OTHER | Facility: CLINIC | Age: 28
End: 2018-04-27

## 2018-04-27 NOTE — TELEPHONE ENCOUNTER
4/27/2018    Call Regarding Onboarding are choices    Attempt 1    Message on voicemail     Comments:           Outreach   AT

## 2018-05-24 NOTE — TELEPHONE ENCOUNTER
5/24/2018      [facility] Choices- on-boarded.             Outreach ,  Lopez Bro

## 2019-02-18 ENCOUNTER — NURSE TRIAGE (OUTPATIENT)
Dept: NURSING | Facility: CLINIC | Age: 29
End: 2019-02-18

## 2019-02-18 NOTE — TELEPHONE ENCOUNTER
"[facility] triage call :   Presenting problem :  Pt called. Hx of bad in headache up to 2 times weekly  - with dizziness and nausea and some light sensitive .  Since 2/13/19 intermittent intense headaches , at least daily for the last week.  Treatment   With Exerdrine with limited relief .     Currently : no fever or injury, headache is at 3/10 on painscale , mild nausea and dizziness .     Guideline used : headache - adult   Disposition and recommendations : see provider in 24 hours and sent to .   Caller verbalizes understanding and denies further questions and will call back if further symptoms to triage or questions  . Ambar Middleton RN  - [place] Nurse Advisor     Reason for Disposition    [1] MODERATE headache (e.g., interferes with normal activities) AND [2] present > 24 hours AND [3] unexplained  (Exceptions: analgesics not tried, typical migraine, or headache part of viral illness)    Additional Information    Negative: Difficult to awaken or acting confused  (e.g., disoriented, slurred speech)    Negative: [1] Weakness of the face, arm or leg on one side of the body AND [2] new onset    Negative: [1] Numbness of the face, arm or leg on one side of the body AND [2] new onset    Negative: [1] Loss of speech or garbled speech AND [2] new onset    Negative: Passed out (i.e., lost consciousness, collapsed and was not responding)    Negative: Sounds like a life-threatening emergency to the triager    Negative: Followed a head injury within last 3 days    Negative: Pregnant    Negative: Traumatic Brain Injury (TBI) is suspected    Negative: Unable to walk, or can only walk with assistance (e.g., requires support)    Negative: Stiff neck (can't touch chin to chest)    Negative: Severe pain in one eye    Negative: [1] Other family members (or roommates) with headaches AND [2] possibility of carbon monoxide exposure    Negative: [1] SEVERE headache (e.g., excruciating) AND [2] \"worst headache\" of life    " Negative: [1] SEVERE headache AND [2] sudden-onset (i.e., reaching maximum intensity within seconds)    Negative: [1] SEVERE headache AND [2] fever    Negative: Loss of vision or double vision (Exception: same as prior migraines)    Negative: [1] Fever > 100.5 F (38.1 C) AND [2] diabetes mellitus or weak immune system (e.g., HIV positive, cancer chemo, splenectomy, organ transplant, chronic steroids)    Negative: Patient sounds very sick or weak to the triager    Negative: [1] SEVERE headache (e.g., excruciating) AND [2] not improved after 2 hours of pain medicine    Negative: [1] Vomiting AND [2] 2 or more times (Exception: similar to previous migraines)    Negative: Fever > 104 F (40 C)    Protocols used: HEADACHE-ADULT-AH

## 2019-02-19 ENCOUNTER — OFFICE VISIT (OUTPATIENT)
Dept: FAMILY MEDICINE | Facility: CLINIC | Age: 29
End: 2019-02-19
Payer: COMMERCIAL

## 2019-02-19 VITALS
TEMPERATURE: 98 F | HEIGHT: 71 IN | DIASTOLIC BLOOD PRESSURE: 87 MMHG | BODY MASS INDEX: 27.97 KG/M2 | RESPIRATION RATE: 16 BRPM | SYSTOLIC BLOOD PRESSURE: 138 MMHG | WEIGHT: 199.8 LBS | HEART RATE: 112 BPM

## 2019-02-19 DIAGNOSIS — R51.9 INTRACTABLE EPISODIC HEADACHE, UNSPECIFIED HEADACHE TYPE: ICD-10-CM

## 2019-02-19 DIAGNOSIS — Z00.01 ENCOUNTER FOR WELL ADULT EXAM WITH ABNORMAL FINDINGS: Primary | ICD-10-CM

## 2019-02-19 PROCEDURE — 36415 COLL VENOUS BLD VENIPUNCTURE: CPT | Performed by: NURSE PRACTITIONER

## 2019-02-19 PROCEDURE — 82947 ASSAY GLUCOSE BLOOD QUANT: CPT | Performed by: NURSE PRACTITIONER

## 2019-02-19 PROCEDURE — 99395 PREV VISIT EST AGE 18-39: CPT | Performed by: NURSE PRACTITIONER

## 2019-02-19 PROCEDURE — 80061 LIPID PANEL: CPT | Performed by: NURSE PRACTITIONER

## 2019-02-19 ASSESSMENT — ENCOUNTER SYMPTOMS
ARTHRALGIAS: 0
PARESTHESIAS: 0
DIARRHEA: 0
JOINT SWELLING: 0
FEVER: 0
DYSURIA: 0
DIZZINESS: 0
HEARTBURN: 0
SORE THROAT: 0
COUGH: 0
NAUSEA: 0
CONSTIPATION: 0
WEAKNESS: 0
MYALGIAS: 1
NERVOUS/ANXIOUS: 0
FREQUENCY: 0
HEADACHES: 1
SHORTNESS OF BREATH: 0
PALPITATIONS: 0
ABDOMINAL PAIN: 0
CHILLS: 0
HEMATOCHEZIA: 0
EYE PAIN: 0
HEMATURIA: 0

## 2019-02-19 ASSESSMENT — PATIENT HEALTH QUESTIONNAIRE - PHQ9
SUM OF ALL RESPONSES TO PHQ QUESTIONS 1-9: 13
SUM OF ALL RESPONSES TO PHQ QUESTIONS 1-9: 13
10. IF YOU CHECKED OFF ANY PROBLEMS, HOW DIFFICULT HAVE THESE PROBLEMS MADE IT FOR YOU TO DO YOUR WORK, TAKE CARE OF THINGS AT HOME, OR GET ALONG WITH OTHER PEOPLE: SOMEWHAT DIFFICULT

## 2019-02-19 ASSESSMENT — MIFFLIN-ST. JEOR: SCORE: 1890.48

## 2019-02-19 NOTE — PROGRESS NOTES
SUBJECTIVE:   CC: Sana Lloyd is an 28 year old male who presents for preventative health visit.     Physical   Annual:     Getting at least 3 servings of Calcium per day:  Yes    Bi-annual eye exam:  Yes    Dental care twice a year:  NO    Sleep apnea or symptoms of sleep apnea:  None    Diet:  Regular (no restrictions)    Frequency of exercise:  None    Taking medications regularly:  Yes    Medication side effects:  Not applicable and None    Additional concerns today:  No    PHQ-2 Total Score: 5    headaches - takes excedrine but doesn't really help  Happens twice a week.   Not stopping him from any daily activities...  Rating pains as a dull throbbing and squeezing pain.    Massage helps temporarily.          Today's PHQ-2 Score:   PHQ-2 ( 1999 [employer]) 2/19/2019   Q1: Little interest or pleasure in doing things 2   Q2: Feeling down, depressed or hopeless 3   PHQ-2 Score 5   Q1: Little interest or pleasure in doing things More than half the days   Q2: Feeling down, depressed or hopeless Nearly every day   PHQ-2 Score 5       Abuse: Current or Past(Physical, Sexual or Emotional)- No  Do you feel safe in your environment? Yes    Social History     Tobacco Use     Smoking status: Current Every Day Smoker     Smokeless tobacco: Never Used   Substance Use Topics     Alcohol use: Yes     Alcohol Use 2/19/2019   If you drink alcohol do you typically have greater than 3 drinks per day OR greater than 7 drinks per week? No       Last PSA: No results found for: PSA    Reviewed orders with patient. Reviewed health maintenance and updated orders accordingly - Yes    Reviewed and updated as needed this visit by clinical staff  Tobacco  Allergies  Meds  Problems  Med Hx  Surg Hx  Fam Hx         Reviewed and updated as needed this visit by Provider  Tobacco  Allergies  Meds  Problems  Med Hx  Surg Hx  Fam Hx          Review of Systems   Constitutional: Negative for chills and fever.   HENT: Negative for  "congestion, ear pain, hearing loss and sore throat.    Eyes: Negative for pain and visual disturbance.   Respiratory: Negative for cough and shortness of breath.    Cardiovascular: Negative for chest pain, palpitations and peripheral edema.   Gastrointestinal: Negative for abdominal pain, constipation, diarrhea, heartburn, hematochezia and nausea.   Genitourinary: Negative for discharge, dysuria, frequency, genital sores, hematuria, impotence and urgency.   Musculoskeletal: Positive for myalgias. Negative for arthralgias and joint swelling.   Skin: Negative for rash.   Neurological: Positive for headaches. Negative for dizziness, weakness and paresthesias.   Psychiatric/Behavioral: Negative for mood changes. The patient is not nervous/anxious.        OBJECTIVE:   /87   Pulse 112   Temp 98  F (36.7  C) (Oral)   Resp 16   Ht 1.791 m (5' 10.5\")   Wt 90.6 kg (199 lb 12.8 oz)   BMI 28.26 kg/m      Physical Exam  GENERAL: healthy, alert and no distress  EYES: Eyes grossly normal to inspection, PERRL and conjunctivae and sclerae normal  HENT: ear canals and TM's normal, nose and mouth without ulcers or lesions  NECK: no adenopathy, no asymmetry, masses, or scars and thyroid normal to palpation  RESP: lungs clear to auscultation - no rales, rhonchi or wheezes  CV: regular rate and rhythm, normal S1 S2, no S3 or S4, no murmur, click or rub, no peripheral edema and peripheral pulses strong  ABDOMEN: soft, nontender, no hepatosplenomegaly, no masses and bowel sounds normal  MS: no gross musculoskeletal defects noted, no edema  SKIN: no suspicious lesions or rashes  NEURO: CN II-XII intact.  Normal strength and tone, mentation intact and speech normal  PSYCH: mentation appears normal, affect normal/bright      ASSESSMENT/PLAN:       ICD-10-CM    1. Encounter for well adult exam with abnormal findings Z00.01 Lipid panel reflex to direct LDL Fasting     Glucose   2. Intractable episodic headache, unspecified headache " "type R51 NEUROLOGY ADULT REFERRAL      well male, not fasting for labs.  Encouraged to continue exercise and healthy diet choices.      Refer to neurology for recurrent twice weekly headache.    COUNSELING:   Reviewed preventive health counseling, as reflected in patient instructions    BP Readings from Last 1 Encounters:   02/19/19 138/87     Estimated body mass index is 28.26 kg/m  as calculated from the following:    Height as of this encounter: 1.791 m (5' 10.5\").    Weight as of this encounter: 90.6 kg (199 lb 12.8 oz).     reports that he has been smoking.  he has never used smokeless tobacco.      Counseling Resources:  ATP IV Guidelines  Pooled Cohorts Equation Calculator  FRAX Risk Assessment  ICSI Preventive Guidelines  Dietary Guidelines for Americans, 2010  USDA's MyPlate  ASA Prophylaxis  Lung CA Screening    JEWEL Cortez CNP  [facility]  Answers for HPI/ROS submitted by the patient on 2/19/2019   Annual Exam:  If you checked off any problems, how difficult have these problems made it for you to do your work, take care of things at home, or get along with other people?: Somewhat difficult  PHQ9 TOTAL SCORE: 13    "

## 2019-02-20 LAB
CHOLEST SERPL-MCNC: 228 MG/DL
GLUCOSE SERPL-MCNC: 83 MG/DL (ref 70–99)
HDLC SERPL-MCNC: 31 MG/DL
LDLC SERPL CALC-MCNC: 137 MG/DL
NONHDLC SERPL-MCNC: 197 MG/DL
TRIGL SERPL-MCNC: 301 MG/DL

## 2019-02-20 ASSESSMENT — PATIENT HEALTH QUESTIONNAIRE - PHQ9: SUM OF ALL RESPONSES TO PHQ QUESTIONS 1-9: 13

## 2019-03-01 ENCOUNTER — OFFICE VISIT (OUTPATIENT)
Dept: NEUROLOGY | Facility: CLINIC | Age: 29
End: 2019-03-01
Payer: COMMERCIAL

## 2019-03-01 VITALS
DIASTOLIC BLOOD PRESSURE: 68 MMHG | HEART RATE: 95 BPM | SYSTOLIC BLOOD PRESSURE: 122 MMHG | OXYGEN SATURATION: 99 % | BODY MASS INDEX: 28.29 KG/M2 | TEMPERATURE: 97.8 F | WEIGHT: 200 LBS | RESPIRATION RATE: 18 BRPM

## 2019-03-01 DIAGNOSIS — G43.009 MIGRAINE WITHOUT AURA AND WITHOUT STATUS MIGRAINOSUS, NOT INTRACTABLE: Primary | ICD-10-CM

## 2019-03-01 DIAGNOSIS — G44.219 EPISODIC TENSION-TYPE HEADACHE, NOT INTRACTABLE: ICD-10-CM

## 2019-03-01 PROCEDURE — 99205 OFFICE O/P NEW HI 60 MIN: CPT | Performed by: PSYCHIATRY & NEUROLOGY

## 2019-03-01 RX ORDER — NAPROXEN 500 MG/1
500 TABLET ORAL DAILY PRN
Qty: 15 TABLET | Refills: 3 | Status: SHIPPED | OUTPATIENT
Start: 2019-03-01 | End: 2019-06-30

## 2019-03-01 RX ORDER — VENLAFAXINE HYDROCHLORIDE 37.5 MG/1
37.5 CAPSULE, EXTENDED RELEASE ORAL DAILY
Qty: 30 CAPSULE | Refills: 3 | Status: SHIPPED | OUTPATIENT
Start: 2019-03-01 | End: 2019-05-31

## 2019-03-01 RX ORDER — SUMATRIPTAN 25 MG/1
25 TABLET, FILM COATED ORAL
Qty: 18 TABLET | Refills: 3 | Status: SHIPPED | OUTPATIENT
Start: 2019-03-01 | End: 2019-05-31

## 2019-03-01 NOTE — PROGRESS NOTES
INITIAL NEUROLOGY CONSULTATION    DATE OF VISIT: 3/1/2019  CLINIC LOCATION: [facility]  MRN: [id number]  PATIENT NAME: Sana Lloyd  YOB: 1990    PRIMARY CARE PROVIDER: Physician No Ref-Primary     REASON FOR VISIT:   Chief Complaint   Patient presents with     Headache     HISTORY OF PRESENT ILLNESS:                                                    Mr. Sana Lloyd is 28 year old right handed male patient without significant past medical history, who was seen in consultation today requested by Milena Pickering CNP, for headache.    Per patient's report, he has long-standing history of intermittent headaches.  Over the last year they worsened.  Currently, he has 2 types of headaches.    First type is intermittent throbbing unilateral 8/10 headache that typically occurs on the right side of the head approximately once per month lasting whole day.  It is accompanied by dizziness, nausea, light sensitivity, blurry vision, neck stiffness, and intolerance of physical activity.  Symptoms worsened by light and looking at work screen all day.  No phonophobia, no visual auras, no focal neurological symptoms.  He needs to rest and sleep in a dark quiet room.  Tried Excedrin and ibuprofen, but they were not effective.    In addition, the patient has dull pressure 4-5/10 headache that is located in bifrontal, bitemporal, and bioccipital areas and usually present upon awakening almost every day.  Approximately once per week it worsens to 6/10.  At that time, he also has feeling of eye pressure and ache behind the eyes.  Other associated symptoms include increased sensitivity to light, dizziness, and blurry vision. No intolerance of physical activity.  No focal neurological symptoms.  Massage helps temporarily.  Lying down also helps.  Tried Excedrin and ibuprofen that were effective in the past, but not recently.  Takes analgesics approximately 2 times per week.    The patient did not try Aleve  in the past.  No preventive medications.    The patient reports that his sleep is sporadic, approximately 5 hours per night.  He eats 2 regular meals and several snacks between them.  He stopped using all caffeine.  He drinks 8 cups of water per day.  He rates his stress level as high.    Most recent labs from February 2019 include normal glucose and elevated LDL of 137.  CBC from March 2018 was normal.    No prior brain imaging.  No additional useful information is available in Care Everywhere, which was reviewed.    The patient denies a history of recent head injury. Prior neurological history: negative for stroke, brain neoplasms, seizure disorders, multiple sclerosis, meningitis, encephalitis, and major head injuries.    Neurologic Review of Systems - no amaurosis, diplopia, abnormal speech, unilateral numbness or weakness. He endorses insomnia, fatigue, weight gain, anxiety, depression, panic attacks, leg paresthesias, sinus problems, stomach pain, and rash.  All of these problems are minor and have not been previously discussed with other medical providers.  The patient will discuss them in the future if they become more significant.  Otherwise, he denies any other complaints on 14-point comprehensive review of systems.  PAST MEDICAL/SURGICAL HISTORY:                                                    I personally reviewed patient's past medical and surgical history with the patient at today's visit.  Past medical history:  1.  Anorectal chlamydial infection.  2.  Secondary syphilis.  Past surgical history: No prior surgeries.  MEDICATIONS:                                                    I personally reviewed patient's medications and allergies with the patient at today's visit.  Current Outpatient Medications on File Prior to Visit:  aspirin-acetaminophen-caffeine (EXCEDRIN MIGRAINE) 250-250-65 MG tablet Take 2 tablets by mouth every 8 hours as needed for headaches   acetaminophen-caffeine (EXCEDRIN  TENSION HEADACHE) 500-65 MG TABS Take 2 tablets by mouth every 6 hours as needed for mild pain   APPLE CIDER VINEGAR PO    Biotin 10 MG TABS tablet Take 10,000 mcg by mouth daily   IBUPROFEN PO    order for DME Equipment being ordered: sling (Patient not taking: Reported on 3/1/2019)     ALLERGIES:                                                    No Known Allergies  FAMILY/SOCIAL HISTORY:                                                    Family and social history was reviewed with the patient at today's visit.  Family history is positive for migraine, headache, and dementia.  Current everyday smoker (counseled to quit), one alcohol drink or less per week, denies current recreational drug use.  Single, lives with friend.  Works full-time as a  for the last 2 years.  REVIEW OF SYSTEMS:                                                    Patient has completed a [facility] Patient Health History, including a 14-system review, which was personally reviewed, and pertinent positives are listed in HPI. He denies any additional problems on the further questioning.  EXAM:                                                    VITAL SIGNS:   /68 (BP Location: Left arm, Patient Position: Sitting, Cuff Size: Adult Regular)   Pulse 95   Temp 97.8  F (36.6  C) (Oral)   Resp 18   Wt 90.7 kg (200 lb)   SpO2 99%   BMI 28.29 kg/m    Mini-Cog Assessment:  Mini Cog Assessment  Clock Draw Score: 2 Normal  3 Item Recall: 3 objects recalled  Mini Cog Total Score: 5  Administered by: : Britney Escobar MA    General: pt is in NAD, cooperative.  Skin: normal turgor, moist mucous membranes, no lesions/rashes noticed.  HEENT: ATNC, EOMI, PERRL, white sclera, normal conjunctiva, no nystagmus or ptosis. No carotid bruits bilaterally.  Respiratory: lung sounds clear to auscultation bilaterally, no crackles, wheezes, rhonchi. Symmetric lung excursion, no accessory respiratory muscle use.  Cardiovascular:  normal S1/S2, no murmurs/rubs/gallops.   Abdomen: Not distended.  : deferred.    Neurological:  Mental: alert, follows commands, Mini Cog Total Score: 5/5 with 3/3 on memory recall, no aphasia or dysarthria. Fund of knowledge is appropriate for age.  Cranial Nerves:  CN II: visual acuity - able to accurately count fingers with each eye. Visual fields intact, fundi: discs sharp, no papilledema and normal vessels bilaterally.  CN III, IV, VI: EOM intact, pupils equal and reactive  CN V: facial sensation nl  CN VII: face symmetric, no facial droop  CN VIII: hearing normal  CN IX: palate elevation symmetric, uvula at midline  CN XI SCM normal, shoulder shrug nl  CN XII: tongue midline  Motor: Strength: 5/5 in all major groups of all extremities. Normal tone. No abnormal movements. No pronator drift b/l.  Reflexes: Triceps, biceps, brachioradialis, patellar, and achilles reflexes normal and symmetric. No clonus noted. Toes are down-going b/l.   Sensory: temperature, light touch, pinprick, and vibration intact. Romberg: negative.  Coordination: FNF and heel-shin tests intact b/l.  Gait:  Normal, able to tandem, toe, and heel walk.  DATA:   LABS/IMAGING/OTHER STUDIES: I reviewed pertinent medical records, including Care Everywhere, as detailed in the history of present illness.  ASSESSMENT and PLAN:      ASSESSMENT: Sana Lloyd is a 28 year old male patient without significant past medical history, who presents with intermittent chronic headaches.    We had a prolonged discussion with the patient regarding his symptoms.  His neurological exam today is non-focal.  He did not have previous brain imaging.  However, at this time he does not have any indication to do it.  Brain MRI might be considered in the future if patient's headaches become resistant to treatment, worsen, or he develops focal neurological symptoms.    The clinical presentation is most likely consistent with multifactorial headache disorder, including  tension type and migrainous components.  We reviewed in detail his diagnosis, available treatment options, and the plan, as summarized below.    DIAGNOSES:    ICD-10-CM    1. Migraine without aura and without status migrainosus, not intractable G43.009 naproxen (NAPROSYN) 500 MG tablet     SUMAtriptan (IMITREX) 25 MG tablet     venlafaxine (EFFEXOR-XR) 37.5 MG 24 hr capsule   2. Episodic tension-type headache, not intractable G44.219 naproxen (NAPROSYN) 500 MG tablet     venlafaxine (EFFEXOR-XR) 37.5 MG 24 hr capsule     PLAN: At today's visit we thoroughly discussed various diagnostic possibilities for patient's symptoms, possible future evaluation, available treatment options, and the plan.    For headache prevention:  1.  Effexor 37.5 mg daily for the next 3 months.  I asked the patient to send me an update via My Chart in 4-6 weeks after starting this medication.  I also advised him to contact me with any intolerable side effects.  2.  Other discussed future options include doxepin, amitriptyline/nortriptyline, Topamax, Depakote, gabapentin, propranolol, verapamil, and nutraceuticals.  For acute headache therapy:  1.  Imitrex 25 mg for migraine headaches.  Advised the patient that he may repeat dose in 2 hours, but should limit use to less than 9 days/month.  2.  Naproxen 500 mg at onset of intolerable headache.  I counseled the patient to take it with food and limit use to less than 15 days/month.  3.  May also use ibuprofen or Tylenol with similar limits.    Reviewed non-pharmacological headache prevention measures include proper sleep hygiene, regular meals, adequate hydration, quit smoking, regular aerobic exercises, and stress reduction techniques.    I instructed the patient to keep the headache diary and bring it to the next follow-up visit.    Next follow-up appointment is in the next 3 months or earlier if needed.    Total Time:  82 minutes with > 50% spent counseling the patient on stated above  assessment and recommendations, including nature of the diagnosis, possible future w/u, and proposed plan.  Additional time was used to answer questions regarding patient's symptoms, my recommendations, and the plan.    Smooth Durbin MD  / Neurology  [place]  (Chart documentation was completed in part with Dragon voice-recognition software. Even though reviewed, some grammatical, spelling, and word errors may remain.)

## 2019-03-01 NOTE — PATIENT INSTRUCTIONS
AFTER VISIT SUMMARY (AVS):    At today's visit we thoroughly discussed various diagnostic possibilities for your symptoms, possible future evaluation, available treatment options, and the plan.    For headache prevention:  1.  Effexor 37.5 mg daily for the next 3 months.  Please send me an update via My Chart in 4-6 weeks after starting this medication.  2.  Other discussed available options include doxepin, amitriptyline/nortriptyline, Topamax, Depakote, gabapentin, propranolol, verapamil, and nutraceuticals.  For acute headache therapy:  1.  Imitrex 25 mg for migraine headaches.  May repeat dose in 2 hours, but limit use to less than 9 days/month.  2.  Naproxen 500 mg at onset of intolerable headache.  Please take it with food and limit use to less than 15 days/month.  3.  May also use ibuprofen or Tylenol with similar limits.    Reviewed non-pharmacological headache prevention measures include proper sleep hygiene, regular meals, adequate hydration, quit smoking, regular aerobic exercises, and stress reduction techniques.    Please keep the headache diary and bring it to the next follow-up visit.    Next follow-up appointment is in the next 3 months or earlier if needed.    Please do not hesitate to call me with any questions or concerns.    Thanks.

## 2019-04-29 ENCOUNTER — MYC REFILL (OUTPATIENT)
Dept: NEUROLOGY | Facility: CLINIC | Age: 29
End: 2019-04-29

## 2019-04-29 DIAGNOSIS — G43.009 MIGRAINE WITHOUT AURA AND WITHOUT STATUS MIGRAINOSUS, NOT INTRACTABLE: ICD-10-CM

## 2019-04-29 DIAGNOSIS — G44.219 EPISODIC TENSION-TYPE HEADACHE, NOT INTRACTABLE: ICD-10-CM

## 2019-04-29 RX ORDER — VENLAFAXINE HYDROCHLORIDE 37.5 MG/1
37.5 CAPSULE, EXTENDED RELEASE ORAL DAILY
Qty: 30 CAPSULE | Refills: 3 | OUTPATIENT
Start: 2019-04-29

## 2019-04-29 RX ORDER — SUMATRIPTAN 25 MG/1
25 TABLET, FILM COATED ORAL
Qty: 18 TABLET | Refills: 3 | OUTPATIENT
Start: 2019-04-29

## 2019-04-29 RX ORDER — NAPROXEN 500 MG/1
500 TABLET ORAL DAILY PRN
Qty: 15 TABLET | Refills: 3 | OUTPATIENT
Start: 2019-04-29

## 2019-04-29 NOTE — TELEPHONE ENCOUNTER
Message sent to pharmacy for all three requests- Patient has requested refill too soon (SEE ORDER ANDRZEJ SENT 3/1/19 WITH REFILLS.).  Farrukh JOINER

## 2019-04-29 NOTE — TELEPHONE ENCOUNTER
"Requested Prescriptions   Pending Prescriptions Disp Refills     naproxen (NAPROSYN) 500 MG tablet 15 tablet 3     Sig: Take 1 tablet (500 mg) by mouth daily as needed for headaches (Take with food and limit use to less than 15 days/month)  Last Written Prescription Date:  3/1/2019  Last Fill Quantity: 15 tablet,  # refills: 3   Last Office Visit: 2/19/2019   Future Office Visit:    Next 5 appointments (look out 90 days)    May 31, 2019  1:00 PM CDT  [facility] Visit with Smooth Durbin MD  [facility] [address] [facility] [address]  [phone]              NSAID Medications Failed - 4/29/2019  9:05 AM        Failed - Normal ALT on file in past 12 months     No lab results found.          Failed - Normal AST on file in past 12 months     No lab results found.          Failed - Normal CBC on file in past 12 months     Recent Labs   Lab Test 03/06/18  2056   WBC 7.2   RBC 5.66   HGB 16.0   HCT 47.0              Failed - Normal serum creatinine on file in past 12 months     No lab results found.          Passed - Blood pressure under 140/90 in past 12 months     BP Readings from Last 3 Encounters:   03/01/19 122/68   02/19/19 138/87   04/05/18 (!) 126/97           Passed - Recent (12 mo) or future (30 days) visit within the authorizing provider's specialty     Patient had office visit in the last 12 months or has a visit in the next 30 days with authorizing provider or within the authorizing provider's specialty.  See \"Patient Info\" tab in inbasket, or \"Choose Columns\" in Meds & Orders section of the refill encounter.            Passed - Patient is age 6-64 years        Passed - Medication is active on med list     __________________________________________________________________________________________     SUMAtriptan (IMITREX) 25 MG tablet 18 tablet 3     Sig: Take 1 tablet (25 mg) by mouth at onset of headache for migraine (May " "repeat the dose in 2 hours.  Maximum 4 tablets in 24 hours)  Last Written Prescription Date:  3/1/2019  Last Fill Quantity: 18 tablet,  # refills: 3   Last Office Visit: 3/1/2019   Future Office Visit:    Next 5 appointments (look out 90 days)    May 31, 2019  1:00 PM CDT  [facility] Visit with Sm[facility]in [facility]  [facility] [address] [place] [address]  [phone]              Serotonin Agonists Failed - 4/29/2019  9:05 AM        Failed - Serotonin Agonist request needs review.     Please review patient's record. If patient has had 8 or more treatments in the past month, please forward to provider.          Passed - Blood pressure under 140/90 in past 12 months     BP Readings from Last 3 Encounters:   03/01/19 122/68   02/19/19 138/87   04/05/18 (!) 126/97           Passed - Recent (12 mo) or future (30 days) visit within the authorizing provider's specialty     Patient had office visit in the last 12 months or has a visit in the next 30 days with authorizing provider or within the authorizing provider's specialty.  See \"Patient Info\" tab in inbasket, or \"Choose Columns\" in Meds & Orders section of the refill encounter.            Passed - Medication is active on med list        Passed - Patient is age 18 or older     __________________________________________________________________________________________     venlafaxine (EFFEXOR-XR) 37.5 MG 24 hr capsule 30 capsule 3     Sig: Take 1 capsule (37.5 mg) by mouth daily  Last Written Prescription Date:  3/1/2019  Last Fill Quantity: 30 capsule,  # refills: 3   Last Office Visit: 3/1/2019   Future Office Visit:    Next 5 appointments (look out 90 days)    May 31, 2019  1:00 PM CDT  [facility] Visit with Smooth Durbin, [facility]  [facility] [address] [facility] [address] [phone]              " "Serotonin-Norepinephrine Reuptake Inhibitors  Failed - 4/29/2019  9:05 AM        Failed - Normal serum creatinine on file in past 12 months     No lab results found.          Passed - Blood pressure under 140/90 in past 12 months     BP Readings from Last 3 Encounters:   03/01/19 122/68   02/19/19 138/87   04/05/18 (!) 126/97           Passed - Recent (12 mo) or future (30 days) visit within the authorizing provider's specialty     Patient had office visit in the last 12 months or has a visit in the next 30 days with authorizing provider or within the authorizing provider's specialty.  See \"Patient Info\" tab in inbasket, or \"Choose Columns\" in Meds & Orders section of the refill encounter.            Passed - Medication is active on med list        Passed - Patient is age 18 or older          "

## 2019-04-30 ENCOUNTER — MYC REFILL (OUTPATIENT)
Dept: NEUROLOGY | Facility: CLINIC | Age: 29
End: 2019-04-30

## 2019-04-30 DIAGNOSIS — G44.219 EPISODIC TENSION-TYPE HEADACHE, NOT INTRACTABLE: ICD-10-CM

## 2019-04-30 DIAGNOSIS — G43.009 MIGRAINE WITHOUT AURA AND WITHOUT STATUS MIGRAINOSUS, NOT INTRACTABLE: ICD-10-CM

## 2019-04-30 RX ORDER — NAPROXEN 500 MG/1
500 TABLET ORAL DAILY PRN
Qty: 15 TABLET | Refills: 3 | Status: CANCELLED | OUTPATIENT
Start: 2019-04-30

## 2019-04-30 RX ORDER — VENLAFAXINE HYDROCHLORIDE 37.5 MG/1
37.5 CAPSULE, EXTENDED RELEASE ORAL DAILY
Qty: 30 CAPSULE | Refills: 3 | Status: CANCELLED | OUTPATIENT
Start: 2019-04-30

## 2019-04-30 RX ORDER — SUMATRIPTAN 25 MG/1
25 TABLET, FILM COATED ORAL
Qty: 18 TABLET | Refills: 3 | Status: CANCELLED | OUTPATIENT
Start: 2019-04-30

## 2019-04-30 NOTE — TELEPHONE ENCOUNTER
"Requested Prescriptions   Pending Prescriptions Disp Refills     naproxen (NAPROSYN) 500 MG tablet  Last Written Prescription Date:  03/01/2019  Last Fill Quantity: 15 tablet,  # refills: 3   Last Office Visit: 02/19/2019 [place]  Future Office Visit:    Next 5 appointments (look out 90 days)    May 31, 2019  1:00 PM CDT  Return Visit with Smooth Durbin MD  [facility] ([facility]) [address] [address] [phone]          15 tablet 3     Sig: Take 1 tablet (500 mg) by mouth daily as needed for headaches (Take with food and limit use to less than 15 days/month)       NSAID Medications Failed - 4/30/2019  5:40 PM        Failed - Normal ALT on file in past 12 months     No lab results found.          Failed - Normal AST on file in past 12 months     No lab results found.          Failed - Normal CBC on file in past 12 months     Recent Labs   Lab Test 03/06/18  2056   WBC 7.2   RBC 5.66   HGB 16.0   HCT 47.0                    Failed - Normal serum creatinine on file in past 12 months     No lab results found.          Passed - Blood pressure under 140/90 in past 12 months     BP Readings from Last 3 Encounters:   03/01/19 122/68   02/19/19 138/87   04/05/18 (!) 126/97                 Passed - Recent (12 mo) or future (30 days) visit within the authorizing provider's specialty     Patient had office visit in the last 12 months or has a visit in the next 30 days with authorizing provider or within the authorizing provider's specialty.  See \"Patient Info\" tab in inbasket, or \"Choose Columns\" in Meds & Orders section of the refill encounter.              Passed - Patient is age 6-64 years        Passed - Medication is active on med list   _________________________________________________________________________________________________________________________       SUMAtriptan (IMITREX) 25 MG tablet  Last Written Prescription Date:  " "03/01/2019  Last Fill Quantity: 18 tablet,  # refills: 3   Last Office Visit: 02/19/2019 [place]  Future Office Visit:    Next 5 appointments (look out 90 days)    May 31, 2019  1:00 PM CDT  Return Visit with Smooth Durbin MD  [facility] ([facility]) [address] [id number] [phone]          18 tablet 3     Sig: Take 1 tablet (25 mg) by mouth at onset of headache for migraine (May repeat the dose in 2 hours.  Maximum 4 tablets in 24 hours)       Serotonin Agonists Failed - 4/30/2019  5:40 PM        Failed - Serotonin Agonist request needs review.     Please review patient's record. If patient has had 8 or more treatments in the past month, please forward to provider.          Passed - Blood pressure under 140/90 in past 12 months     BP Readings from Last 3 Encounters:   03/01/19 122/68   02/19/19 138/87   04/05/18 (!) 126/97                 Passed - Recent (12 mo) or future (30 days) visit within the authorizing provider's specialty     Patient had office visit in the last 12 months or has a visit in the next 30 days with authorizing provider or within the authorizing provider's specialty.  See \"Patient Info\" tab in inbasket, or \"Choose Columns\" in Meds & Orders section of the refill encounter.              Passed - Medication is active on med list        Passed - Patient is age 18 or older   _________________________________________________________________________________________________________________________       venlafaxine (EFFEXOR-XR) 37.5 MG 24 hr capsule 30 capsule 3     Sig: Take 1 capsule (37.5 mg) by mouth daily  Last Written Prescription Date:  03/01/2019  Last Fill Quantity: 30 capsule,  # refills: 3   Last Office Visit: 02/19/2019 [place]  Future Office Visit:    Next 5 appointments (look out 90 days)    May 31, 2019  1:00 PM CDT  Return Visit with Smooth Durbin MD  [facility] " "([facility]) [address] [address]  [phone]                Serotonin-Norepinephrine Reuptake Inhibitors  Failed - 4/30/2019  5:40 PM        Failed - Normal serum creatinine on file in past 12 months     No lab results found.          Passed - Blood pressure under 140/90 in past 12 months     BP Readings from Last 3 Encounters:   03/01/19 122/68   02/19/19 138/87   04/05/18 (!) 126/97                 Passed - Recent (12 mo) or future (30 days) visit within the authorizing provider's specialty     Patient had office visit in the last 12 months or has a visit in the next 30 days with authorizing provider or within the authorizing provider's specialty.  See \"Patient Info\" tab in inbasket, or \"Choose Columns\" in Meds & Orders section of the refill encounter.              Passed - Medication is active on med list        Passed - Patient is age 18 or older        "

## 2019-05-03 NOTE — TELEPHONE ENCOUNTER
Has refills encouraged to contact pharmacy directly    Closing encounter - no further actions needed at this time    Oliver Vizcaino RN

## 2019-05-31 ENCOUNTER — OFFICE VISIT (OUTPATIENT)
Dept: NEUROLOGY | Facility: CLINIC | Age: 29
End: 2019-05-31
Payer: COMMERCIAL

## 2019-05-31 VITALS
TEMPERATURE: 98 F | SYSTOLIC BLOOD PRESSURE: 122 MMHG | BODY MASS INDEX: 26.45 KG/M2 | OXYGEN SATURATION: 98 % | DIASTOLIC BLOOD PRESSURE: 72 MMHG | HEART RATE: 89 BPM | WEIGHT: 187 LBS | RESPIRATION RATE: 16 BRPM

## 2019-05-31 DIAGNOSIS — G44.219 EPISODIC TENSION-TYPE HEADACHE, NOT INTRACTABLE: ICD-10-CM

## 2019-05-31 DIAGNOSIS — G43.009 MIGRAINE WITHOUT AURA AND WITHOUT STATUS MIGRAINOSUS, NOT INTRACTABLE: Primary | ICD-10-CM

## 2019-05-31 PROCEDURE — 99214 OFFICE O/P EST MOD 30 MIN: CPT | Performed by: PSYCHIATRY & NEUROLOGY

## 2019-05-31 RX ORDER — VENLAFAXINE HYDROCHLORIDE 37.5 MG/1
37.5 CAPSULE, EXTENDED RELEASE ORAL DAILY
Qty: 30 CAPSULE | Refills: 3 | Status: SHIPPED | OUTPATIENT
Start: 2019-05-31 | End: 2019-09-06

## 2019-05-31 RX ORDER — SUMATRIPTAN 25 MG/1
25 TABLET, FILM COATED ORAL
Qty: 18 TABLET | Refills: 3 | Status: SHIPPED | OUTPATIENT
Start: 2019-05-31

## 2019-05-31 RX ORDER — NAPROXEN 500 MG/1
500 TABLET ORAL DAILY PRN
Qty: 15 TABLET | Refills: 3 | Status: CANCELLED | OUTPATIENT
Start: 2019-05-31

## 2019-05-31 NOTE — PATIENT INSTRUCTIONS
AFTER VISIT SUMMARY (AVS):    At today's visit we thoroughly discussed current symptoms, available treatment options, and the plan.  We will not make any medication changes.    Please keep the headache diary and bring it to the next follow-up visit.    Next follow-up appointment is in the next 3 months or earlier if needed.    Please do not hesitate to call me with any questions or concerns.    Thanks.

## 2019-05-31 NOTE — PROGRESS NOTES
ESTABLISHED PATIENT NEUROLOGY NOTE    DATE OF VISIT: 5/31/2019  CLINIC LOCATION: [facility]  MRN: [id number]  PATIENT NAME: Sana Lloyd  YOB: 1990    PCP: JEWEL Cortez CNP    REASON FOR VISIT:   Chief Complaint   Patient presents with     Follow Up     headache-stable since last visit. Only two bad headaches     SUBJECTIVE:                                                      HISTORY OF PRESENT ILLNESS: Patient is here for follow up regarding headache. Please refer to my initial note from 03/01/2019 for further information.    Since the last visit, the patient reports that headaches improved.  The patient contacted me via My Chart in April 2019 reporting that his headaches got better, decreased in frequency.  Today he reports that he only had 2 bad headaches.  We reviewed his headache diary together.  In March, he had 8 headaches, and in April four.  Only mild infrequent headaches in May so far.  At the initial visit, the patient was started on low-dose of Effexor (37.5 mg daily) that is tolerated well without noticeable side effects.  For acute therapy he uses Imitrex and naproxen.  Both are effective.  Denies interval development of new focal neurological symptoms.    On review of systems, patient endorses no additional active complaints. Medications, allergies, family and social history were also reviewed. There are no changes reported by patient.  REVIEW OF SYSTEMS:                                                    10-system review was completed. Pertinent positives are included in HPI. The remainder of ROS is negative.  EXAM:                                                    Physical Exam:   Vitals: /72 (BP Location: Left arm, Patient Position: Sitting, Cuff Size: Adult Regular)   Pulse 89   Temp 98  F (36.7  C) (Oral)   Resp 16   Wt 84.8 kg (187 lb)   SpO2 98%   BMI 26.45 kg/m      General: pt is in NAD, cooperative.  Skin: normal turgor, moist  mucous membranes, no lesions/rashes noticed.  HEENT: ATNC, white sclera, normal conjunctiva.  Respiratory: Symmetric lung excursion, no accessory respiratory muscle use.  Abdomen: Non distended.  Neurological: awake, cooperative, follows commands, no aphasia or dysarthria noted, cranial nerves II-XII: no ptosis, extraocular motility is full, face is symmetric, tongue is midline, equally moves all extremities, no dysmetria bilaterally, casual gait is normal.  ASSESSMENT AND PLAN:                                                    Assessment: 28-year-old male patient with multifactorial (migrainous and tension type) headache disorder presents for follow-up.  He is on Effexor 37.5 mg daily, tolerated well, for headache prevention.  For acute therapy he takes naproxen and Imitrex (both are effective).  He reports noticeable improvement.    We had a detailed discussion with the patient regarding his current symptoms and available treatments.  We reviewed future options of continuing Effexor at the present dose for several more months to see if headaches improve even more versus increasing the dose if headaches worsen or continue to affect patient's life.  We will not make any medication changes now, but will revisit this question at the next follow-up visit.  I renewed Effexor and Imitrex.    Diagnoses:    ICD-10-CM    1. Migraine without aura and without status migrainosus, not intractable G43.009 venlafaxine (EFFEXOR-XR) 37.5 MG 24 hr capsule     SUMAtriptan (IMITREX) 25 MG tablet   2. Episodic tension-type headache, not intractable G44.219 venlafaxine (EFFEXOR-XR) 37.5 MG 24 hr capsule     Plan: At today's visit we thoroughly discussed current symptoms, available treatment options, and the plan.  We will not make any medication changes.    Counseled the patient to keep the headache diary and bring it to the next follow-up visit.    Next follow-up appointment is in the next 3 months or earlier if needed.    Total Time:  25 minutes with > 50% spent counseling the patient on stated above assessment and recommendations.    Smooth Durbin MD  HP/ Neurology

## 2019-08-30 PROBLEM — G43.109 MIGRAINE WITH AURA AND WITHOUT STATUS MIGRAINOSUS, NOT INTRACTABLE: Status: ACTIVE | Noted: 2019-08-30

## 2019-08-30 PROBLEM — G44.219 EPISODIC TENSION-TYPE HEADACHE, NOT INTRACTABLE: Status: ACTIVE | Noted: 2019-08-30

## 2019-08-30 PROBLEM — G43.009 MIGRAINE WITHOUT AURA AND WITHOUT STATUS MIGRAINOSUS, NOT INTRACTABLE: Status: ACTIVE | Noted: 2019-08-30

## 2019-08-30 NOTE — PROGRESS NOTES
ESTABLISHED PATIENT NEUROLOGY NOTE    DATE OF VISIT: 8/30/2019  CLINIC LOCATION: [facility]  MRN: [id number]  PATIENT NAME: Sana Lloyd  YOB: 1990    PCP: JEWEL Cortez CNP    REASON FOR VISIT:   Chief Complaint   Patient presents with     Headache     SUBJECTIVE:                                                      HISTORY OF PRESENT ILLNESS: Patient is here for follow up regarding multifactorial headache disorder.  Last seen on 05/31/2019.  No medication changes were made due to reported headache improvement.  Please refer to my initial/other prior notes for further information.    Since the last visit, the patient reports significant headache improvement on current therapy.  He did not keep headache diary, but reports that he has a headache approximately once in 2 weeks that responds well to naproxen most of the times.  Also has Imitrex, but does not use it much.  He is on 37.5 mg of Effexor daily for headache prevention.  No significant side effects.  No interval development of new neurological symptoms.    On review of systems, patient endorses no other active complaints. Medications, allergies, family and social history were also reviewed. There are no changes reported by patient.  REVIEW OF SYSTEMS:                                                    10-system review was completed. Pertinent positives are included in HPI. The remainder of ROS is negative.  EXAM:                                                    Physical Exam:   Vitals: /87 (BP Location: Left arm, Patient Position: Sitting, Cuff Size: Adult Regular)   Pulse 87   Temp 98.9  F (37.2  C) (Oral)   Resp 18   Wt 80.7 kg (178 lb)   SpO2 97%   BMI 25.18 kg/m      General: pt is in NAD, cooperative.  Skin: normal turgor, moist mucous membranes, no lesions/rashes noticed.  HEENT: ATNC, white sclera, normal conjunctiva.  Respiratory: Symmetric lung excursion, no accessory respiratory muscle  use.  Abdomen: Non distended.  Neurological: awake, cooperative, follows commands, no exam changes compared to the previous visit.  ASSESSMENT AND PLAN:                                                    Assessment: 29-year-old male patient with multifactorial (migrainous and tension type) headache disorder presents for follow-up.  He reports good control of headaches on 37.5 mg of daily Effexor without significant side effects.  For acute therapy he uses naproxen and rarely Imitrex (both are effective).    We had a detailed discussion with the patient regarding his current symptoms, available treatment options, and the plan.  At the previous visit we discussed possible increase in the Effexor dose if he has headaches worsen.  We also reviewed other available preventive treatment options including doxepin, amitriptyline/nortriptyline, Topamax, Depakote, gabapentin, propranolol, verapamil, and CGRP antagonists.  Today, the patient reports headache improvement, and no medication changes are needed.    Diagnoses:    ICD-10-CM    1. Migraine without aura and without status migrainosus, not intractable G43.009 venlafaxine (EFFEXOR-XR) 37.5 MG 24 hr capsule   2. Episodic tension-type headache, not intractable G44.219 venlafaxine (EFFEXOR-XR) 37.5 MG 24 hr capsule     Plan: At today's visit we thoroughly discussed current symptoms, available treatment options, and the plan.  I am pleased to hear that he has good control of the headaches on the current therapy.  We decided not to do any medication changes.  I renewed venlafaxine.    I instructed the patient to keep the headache diary and bring it to the next follow-up visit or upload via My Chart.    Next follow-up appointment is in the next 4 months or earlier if needed.    Total Time: 26 minutes with > 50% spent counseling the patient on stated above assessment and recommendations.    Smooth Durbin MD  / Neurology

## 2019-09-05 ENCOUNTER — MYC MEDICAL ADVICE (OUTPATIENT)
Dept: NEUROLOGY | Facility: CLINIC | Age: 29
End: 2019-09-05

## 2019-09-06 ENCOUNTER — OFFICE VISIT (OUTPATIENT)
Dept: NEUROLOGY | Facility: CLINIC | Age: 29
End: 2019-09-06
Payer: COMMERCIAL

## 2019-09-06 VITALS
DIASTOLIC BLOOD PRESSURE: 87 MMHG | WEIGHT: 178 LBS | RESPIRATION RATE: 18 BRPM | HEART RATE: 87 BPM | SYSTOLIC BLOOD PRESSURE: 127 MMHG | BODY MASS INDEX: 25.18 KG/M2 | TEMPERATURE: 98.9 F | OXYGEN SATURATION: 97 %

## 2019-09-06 DIAGNOSIS — G44.219 EPISODIC TENSION-TYPE HEADACHE, NOT INTRACTABLE: ICD-10-CM

## 2019-09-06 DIAGNOSIS — G43.009 MIGRAINE WITHOUT AURA AND WITHOUT STATUS MIGRAINOSUS, NOT INTRACTABLE: Primary | ICD-10-CM

## 2019-09-06 PROCEDURE — 99214 OFFICE O/P EST MOD 30 MIN: CPT | Performed by: PSYCHIATRY & NEUROLOGY

## 2019-09-06 RX ORDER — VENLAFAXINE HYDROCHLORIDE 37.5 MG/1
37.5 CAPSULE, EXTENDED RELEASE ORAL DAILY
Qty: 30 CAPSULE | Refills: 3 | Status: SHIPPED | OUTPATIENT
Start: 2019-09-06

## 2019-09-06 ASSESSMENT — ANXIETY QUESTIONNAIRES
IF YOU CHECKED OFF ANY PROBLEMS ON THIS QUESTIONNAIRE, HOW DIFFICULT HAVE THESE PROBLEMS MADE IT FOR YOU TO DO YOUR WORK, TAKE CARE OF THINGS AT HOME, OR GET ALONG WITH OTHER PEOPLE: NOT DIFFICULT AT ALL
6. BECOMING EASILY ANNOYED OR IRRITABLE: SEVERAL DAYS
GAD7 TOTAL SCORE: 3
7. FEELING AFRAID AS IF SOMETHING AWFUL MIGHT HAPPEN: SEVERAL DAYS
3. WORRYING TOO MUCH ABOUT DIFFERENT THINGS: SEVERAL DAYS
2. NOT BEING ABLE TO STOP OR CONTROL WORRYING: NOT AT ALL
5. BEING SO RESTLESS THAT IT IS HARD TO SIT STILL: NOT AT ALL
1. FEELING NERVOUS, ANXIOUS, OR ON EDGE: NOT AT ALL

## 2019-09-06 ASSESSMENT — PATIENT HEALTH QUESTIONNAIRE - PHQ9
SUM OF ALL RESPONSES TO PHQ QUESTIONS 1-9: 5
5. POOR APPETITE OR OVEREATING: NOT AT ALL

## 2019-09-06 NOTE — PATIENT INSTRUCTIONS
AFTER VISIT SUMMARY (AVS):    At today's visit we thoroughly discussed current symptoms, available treatment options, and the plan.  I am pleased to hear that you have good control of your headaches on the current therapy.  We decided not to do any medication changes.  I renewed venlafaxine.    Please keep the headache diary and bring it to the next follow-up visit or upload via My Chart.    Next follow-up appointment is in the next 4 months or earlier if needed.    Please do not hesitate to call me with any questions or concerns.    Thanks.

## 2019-09-06 NOTE — LETTER
9/6/2019         RE: Sana Lloyd  [address]  [address]  [place] [address]        Dear Colleague,    Thank you for referring your patient, Sana Lloyd, to the [facility]. Please see a copy of my visit note below.    ESTABLISHED PATIENT NEUROLOGY NOTE    DATE OF VISIT: 8/30/2019  CLINIC LOCATION: [facility]  MRN: [id number]  PATIENT NAME: Sana Lloyd  YOB: 1990    PCP: JEWEL Cortez CNP    REASON FOR VISIT:   Chief Complaint   Patient presents with     Headache     SUBJECTIVE:                                                      HISTORY OF PRESENT ILLNESS: Patient is here for follow up regarding multifactorial headache disorder.  Last seen on 05/31/2019.  No medication changes were made due to reported headache improvement.  Please refer to my initial/other prior notes for further information.    Since the last visit, the patient reports significant headache improvement on current therapy.  He did not keep headache diary, but reports that he has a headache approximately once in 2 weeks that responds well to naproxen most of the times.  Also has Imitrex, but does not use it much.  He is on 37.5 mg of Effexor daily for headache prevention.  No significant side effects.  No interval development of new neurological symptoms.    On review of systems, patient endorses no other active complaints. Medications, allergies, family and social history were also reviewed. There are no changes reported by patient.  REVIEW OF SYSTEMS:                                                    10-system review was completed. Pertinent positives are included in HPI. The remainder of ROS is negative.  EXAM:                                                    Physical Exam:   Vitals: /87 (BP Location: Left arm, Patient Position: Sitting, Cuff Size: Adult Regular)   Pulse 87   Temp 98.9  F (37.2  C) (Oral)   Resp 18   Wt 80.7 kg (178 lb)   SpO2 97%   BMI 25.18  kg/m       General: pt is in NAD, cooperative.  Skin: normal turgor, moist mucous membranes, no lesions/rashes noticed.  HEENT: ATNC, white sclera, normal conjunctiva.  Respiratory: Symmetric lung excursion, no accessory respiratory muscle use.  Abdomen: Non distended.  Neurological: awake, cooperative, follows commands, no exam changes compared to the previous visit.  ASSESSMENT AND PLAN:                                                    Assessment: 29-year-old male patient with multifactorial (migrainous and tension type) headache disorder presents for follow-up.  He reports good control of headaches on 37.5 mg of daily Effexor without significant side effects.  For acute therapy he uses naproxen and rarely Imitrex (both are effective).    We had a detailed discussion with the patient regarding his current symptoms, available treatment options, and the plan.  At the previous visit we discussed possible increase in the Effexor dose if he has headaches worsen.  We also reviewed other available preventive treatment options including doxepin, amitriptyline/nortriptyline, Topamax, Depakote, gabapentin, propranolol, verapamil, and CGRP antagonists.  Today, the patient reports headache improvement, and no medication changes are needed.    Diagnoses:    ICD-10-CM    1. Migraine without aura and without status migrainosus, not intractable G43.009 venlafaxine (EFFEXOR-XR) 37.5 MG 24 hr capsule   2. Episodic tension-type headache, not intractable G44.219 venlafaxine (EFFEXOR-XR) 37.5 MG 24 hr capsule     Plan: At today's visit we thoroughly discussed current symptoms, available treatment options, and the plan.  I am pleased to hear that he has good control of the headaches on the current therapy.  We decided not to do any medication changes.  I renewed venlafaxine.    I instructed the patient to keep the headache diary and bring it to the next follow-up visit or upload via My Chart.    Next follow-up appointment is in the  next 4 months or earlier if needed.    Total Time: 26 minutes with > 50% spent counseling the patient on stated above assessment and recommendations.    Smooth Durbin MD  / Neurology      Again, thank you for allowing me to participate in the care of your patient.        Sincerely,        Smooth Durbin MD

## 2019-09-07 ASSESSMENT — ANXIETY QUESTIONNAIRES: GAD7 TOTAL SCORE: 3

## 2019-09-16 ENCOUNTER — TELEPHONE (OUTPATIENT)
Dept: OTHER | Facility: CLINIC | Age: 29
End: 2019-09-16

## 2020-03-18 ENCOUNTER — VIRTUAL VISIT (OUTPATIENT)
Dept: FAMILY MEDICINE | Facility: OTHER | Age: 30
End: 2020-03-18

## 2020-03-18 NOTE — PROGRESS NOTES
"Date: 2020 12:36:53  Clinician: Teri Brumfield  Clinician NPI: [id number]  Patient: Sana Lloyd  Patient : 1990  Patient [place] [facility] [place] [id number]  Patient Phone: [phone]  Visit Protocol: URI  Patient Summary:  Sana is a 29 year old ( : 1990 ) male who initiated a Visit for cold, sinus infection, or influenza. When asked the question \"Please sign me up to receive news, health information and promotions from [employer].\", Sana responded \"No\".    Sana states his symptoms started 1-2 days ago.   His symptoms consist of a sore throat, malaise, myalgia, and chills. Sana also feels feverish but was unable to measure his temperature.   Symptom details   Sore throat: Sana reports having mild throat pain (1-3 on a 10 point pain scale), has exudate on his tonsils, and can swallow liquids. The lymph nodes in his neck are not enlarged. A rash has not appeared on the skin since the sore throat started.    Sana denies having wheezing, cough, nasal congestion, ear pain, headache, rhinitis, enlarged lymph nodes, facial pain or pressure, and teeth pain. He also denies taking antibiotic medication for the symptoms and having recent facial or sinus surgery in the past 60 days. He is not experiencing dyspnea.   Precipitating events  Sana is not sure if he has been exposed to someone with strep throat. He has not recently been exposed to someone with influenza. Sana has not been in close contact with any high risk individuals.   Pertinent COVID-19 (Coronavirus) information  Sana has not traveled internationally or to the areas where COVID-19 (Coronavirus) is widespread in the last 14 days before the start of his symptoms.   Sana has not had a close contact with a laboratory-confirmed COVID-19 patient within 14 days of symptom onset. He also has not had a close contact with a suspected COVID-19 patient within 14 days of symptom onset.   Sana is not a " healthcare worker and does not work in a healthcare facility.   Pertinent medical history  Sana does not need a return to work/school note.   Weight: 180 lbs   Sana smokes or uses smokeless tobacco.   Weight: 180 lbs    MEDICATIONS: finasteride oral, ALLERGIES: NKDA  Clinician Response:  Dear Sana,   Based on the information you have provided, you do have symptoms that are consistent with Coronavirus (COVID-19).  I&nbsp;am also concerned you may have strep throat, however. So I have sent a prescription for an antibiotic for treatment of possible strep throat. Please still follow the below guidelines.  The coronavirus causes mild to severe respiratory illness with the most common symptoms including fever, cough and difficulty breathing. Unfortunately, many viruses cause similar symptoms and it can be difficult to distinguish between viruses, especially in mild cases, so we are presuming that anyone with cough or fever has coronavirus at this time.  Coronavirus/COVID-19 has reached the point of community spread in [place], meaning that we are finding the virus in people with no known exposure risk for marbella the virus. Given the increasing commonness of coronavirus in the community we are no longer testing patients who are not critically ill.  If you are a health care worker, you should refer to your employee health office for instructions about returning to work.  For everyone else who has cough or fever, you should assume you are infected with coronavirus. Accordingly, you should self-quarantine for seven days from the first day your symptoms started OR 72 hours after your cough and fever completely resolve - WHICHEVER is LONGER. You should call if you find increasing shortness of breath, wheezing or sustained fever above 101.5. If you are significantly short of breath or experience chest pain you should call 911 or report to the nearest emergency department for urgent evaluation.    Isolate yourself  at home.   Do Not allow any visitors  Do Not go to work or school  Do Not go to [facility],  centers, shopping, or other public places.  Do Not shake hands.  Avoid close contact with others (hugging, kissing).   Protect Others:    Cover Your Mouth and Nose with a mask, disposable tissue or wash cloth to avoid spreading germs to others.  Wash your hands and face frequently with soap and water.   If you develop significant shortness of breath that prevents you from doing normal activities, please call 911 or proceed to the nearest emergency room and alert them immediately that you have been in self-isolation for possible coronavirus.   For more information about COVID19 and options for caring for yourself at home, please visit the CDC website at https://www.cdc.gov/coronavirus/2019-ncov/about/steps-when-sick.htmlFor more options for care at [facility], please visit our website at [email or web address]     Diagnosis: Cough  Diagnosis ICD: R05  Prescription: amoxicillin 500 mg oral capsule 20 capsule, 10 days supply. Take 1 capsule by mouth every 12 hours for 10 days. Refills: 0, Refill as needed: no, Allow substitutions: yes  Pharmacy: [id number] - [phone] - [address] [place] [address]

## 2020-10-30 ENCOUNTER — NURSE TRIAGE (OUTPATIENT)
Dept: NURSING | Facility: CLINIC | Age: 30
End: 2020-10-30

## 2020-10-30 NOTE — TELEPHONE ENCOUNTER
Patient calling - says he has a question about a new medication he started today.  Says this was prescribed by his provider at [facility].  Advised patient to call [facility] - phone number given to patient.    Sally Fleming RN  Triage Nurse Advisor

## 2023-01-28 NOTE — LETTER
5/31/2019         RE: Sana Lloyd  [address]  [address]  [place] [address]        Dear Colleague,    Thank you for referring your patient, Sana Lloyd, to the [facility]. Please see a copy of my visit note below.    ESTABLISHED PATIENT NEUROLOGY NOTE    DATE OF VISIT: 5/31/2019  CLINIC LOCATION: [facility]  MRN: [id number]  PATIENT NAME: Sana Lloyd  YOB: 1990    PCP: JEWEL Cortez CNP    REASON FOR VISIT:   Chief Complaint   Patient presents with     Follow Up     headache-stable since last visit. Only two bad headaches     SUBJECTIVE:                                                      HISTORY OF PRESENT ILLNESS: Patient is here for follow up regarding headache. Please refer to my initial note from 03/01/2019 for further information.    Since the last visit, the patient reports that headaches improved.  The patient contacted me via My Chart in April 2019 reporting that his headaches got better, decreased in frequency.  Today he reports that he only had 2 bad headaches.  We reviewed his headache diary together.  In March, he had 8 headaches, and in April for.  Only mild infrequent headaches in May so far.  At the initial visit, the patient was started on low-dose of Effexor (37.5 mg daily) that is tolerated well without noticeable side effects.  For acute therapy he uses Imitrex and naproxen.  Both are effective.  Denies interval development of new focal neurological symptoms.    On review of systems, patient endorses no additional active complaints. Medications, allergies, family and social history were also reviewed. There are no changes reported by patient.  REVIEW OF SYSTEMS:                                                    10-system review was completed. Pertinent positives are included in HPI. The remainder of ROS is negative.  EXAM:                                                    Physical Exam:   Vitals: /72 (BP Location:  Left arm, Patient Position: Sitting, Cuff Size: Adult Regular)   Pulse 89   Temp 98  F (36.7  C) (Oral)   Resp 16   Wt 84.8 kg (187 lb)   SpO2 98%   BMI 26.45 kg/m       General: pt is in NAD, cooperative.  Skin: normal turgor, moist mucous membranes, no lesions/rashes noticed.  HEENT: ATNC, white sclera, normal conjunctiva.  Respiratory: Symmetric lung excursion, no accessory respiratory muscle use.  Abdomen: Non distended.  Neurological: awake, cooperative, follows commands, no aphasia or dysarthria noted, cranial nerves II-XII: no ptosis, extraocular motility is full, face is symmetric, tongue is midline, equally moves all extremities, no dysmetria bilaterally, casual gait is normal.  ASSESSMENT AND PLAN:                                                    Assessment: 28-year-old male patient with multifactorial (migrainous and tension type) headache disorder presents for follow-up.  He is on Effexor 37.5 mg daily, tolerated well, for headache prevention.  For acute therapy he takes naproxen and Imitrex (both are effective).  He reports noticeable improvement.    We had a detailed discussion with the patient regarding his current symptoms and available treatments.  We reviewed future options of continuing Effexor at the present dose for several more months to see if headaches improve even more versus increasing the dose if headaches worsen or continue to affect patient's life.  We will not make any medication changes now, but will revisit this question at the next follow-up visit.  I renewed Effexor and Imitrex.    Diagnoses:    ICD-10-CM    1. Migraine without aura and without status migrainosus, not intractable G43.009 venlafaxine (EFFEXOR-XR) 37.5 MG 24 hr capsule     SUMAtriptan (IMITREX) 25 MG tablet   2. Episodic tension-type headache, not intractable G44.219 venlafaxine (EFFEXOR-XR) 37.5 MG 24 hr capsule     Plan: At today's visit we thoroughly discussed current symptoms, available treatment options,  vag bleed and the plan.  We will not make any medication changes.    Counseled the patient to keep the headache diary and bring it to the next follow-up visit.    Next follow-up appointment is in the next 3 months or earlier if needed.    Total Time: 25 minutes with > 50% spent counseling the patient on stated above assessment and recommendations.    Smooth Durbin MD  / Neurology      Again, thank you for allowing me to participate in the care of your patient.        Sincerely,        Smooth Durbin MD     vag bleed/vaginal bleeding